# Patient Record
Sex: FEMALE | Race: OTHER | NOT HISPANIC OR LATINO
[De-identification: names, ages, dates, MRNs, and addresses within clinical notes are randomized per-mention and may not be internally consistent; named-entity substitution may affect disease eponyms.]

---

## 2021-07-16 PROBLEM — Z00.00 ENCOUNTER FOR PREVENTIVE HEALTH EXAMINATION: Status: ACTIVE | Noted: 2021-07-16

## 2021-07-20 ENCOUNTER — APPOINTMENT (OUTPATIENT)
Dept: OBGYN | Facility: HOSPITAL | Age: 51
End: 2021-07-20

## 2022-01-27 ENCOUNTER — APPOINTMENT (OUTPATIENT)
Dept: OBGYN | Facility: CLINIC | Age: 52
End: 2022-01-27
Payer: MEDICARE

## 2022-01-27 VITALS
DIASTOLIC BLOOD PRESSURE: 88 MMHG | SYSTOLIC BLOOD PRESSURE: 136 MMHG | WEIGHT: 124 LBS | HEIGHT: 60 IN | HEART RATE: 112 BPM | BODY MASS INDEX: 24.35 KG/M2

## 2022-01-27 DIAGNOSIS — Z01.411 ENCOUNTER FOR GYNECOLOGICAL EXAMINATION (GENERAL) (ROUTINE) WITH ABNORMAL FINDINGS: ICD-10-CM

## 2022-01-27 PROCEDURE — 99386 PREV VISIT NEW AGE 40-64: CPT

## 2022-01-27 NOTE — HISTORY OF PRESENT ILLNESS
[FreeTextEntry1] : 50 y/o female  LMP 22, regular but heavy\par last mammo\par has not seen GYN in 20 years, does not remember when had last pap smear\par has pelvic pain that radiates to her legs, take OTC pain meds for pelvic pain. pain started to get worse on monday [Heavy Bleeding] : heavy bleeding

## 2022-01-27 NOTE — PLAN
[FreeTextEntry1] : NOEMI is a 51 year year old  presenting for well woman exam. Sexually active, monogamous with 1 male partner. \par \par has pelvic pain and heavy menses. will get pelvic sono\par \par HCM\par pap/hpv\par mammo\par gastro referral for screening colonoscopy

## 2022-01-28 LAB — HPV HIGH+LOW RISK DNA PNL CVX: NOT DETECTED

## 2022-02-02 LAB — CYTOLOGY CVX/VAG DOC THIN PREP: NORMAL

## 2022-02-03 ENCOUNTER — RESULT REVIEW (OUTPATIENT)
Age: 52
End: 2022-02-03

## 2022-02-03 ENCOUNTER — APPOINTMENT (OUTPATIENT)
Dept: MAMMOGRAPHY | Facility: CLINIC | Age: 52
End: 2022-02-03
Payer: MEDICARE

## 2022-02-03 PROCEDURE — 77067 SCR MAMMO BI INCL CAD: CPT

## 2022-02-03 PROCEDURE — 77063 BREAST TOMOSYNTHESIS BI: CPT

## 2022-02-04 DIAGNOSIS — R92.8 OTHER ABNORMAL AND INCONCLUSIVE FINDINGS ON DIAGNOSTIC IMAGING OF BREAST: ICD-10-CM

## 2022-02-09 ENCOUNTER — APPOINTMENT (OUTPATIENT)
Dept: OBGYN | Facility: CLINIC | Age: 52
End: 2022-02-09
Payer: MEDICAID

## 2022-02-09 ENCOUNTER — ASOB RESULT (OUTPATIENT)
Age: 52
End: 2022-02-09

## 2022-02-09 VITALS
HEIGHT: 60 IN | SYSTOLIC BLOOD PRESSURE: 127 MMHG | WEIGHT: 122 LBS | DIASTOLIC BLOOD PRESSURE: 83 MMHG | BODY MASS INDEX: 23.95 KG/M2

## 2022-02-09 DIAGNOSIS — D21.9 BENIGN NEOPLASM OF CONNECTIVE AND OTHER SOFT TISSUE, UNSPECIFIED: ICD-10-CM

## 2022-02-09 DIAGNOSIS — N80.0 ENDOMETRIOSIS OF UTERUS: ICD-10-CM

## 2022-02-09 DIAGNOSIS — N92.0 EXCESSIVE AND FREQUENT MENSTRUATION WITH REGULAR CYCLE: ICD-10-CM

## 2022-02-09 PROCEDURE — 76830 TRANSVAGINAL US NON-OB: CPT

## 2022-02-09 PROCEDURE — 99213 OFFICE O/P EST LOW 20 MIN: CPT

## 2022-02-09 PROCEDURE — 76857 US EXAM PELVIC LIMITED: CPT

## 2022-02-11 ENCOUNTER — RESULT REVIEW (OUTPATIENT)
Age: 52
End: 2022-02-11

## 2022-02-11 ENCOUNTER — APPOINTMENT (OUTPATIENT)
Dept: MAMMOGRAPHY | Facility: IMAGING CENTER | Age: 52
End: 2022-02-11

## 2022-02-11 ENCOUNTER — APPOINTMENT (OUTPATIENT)
Dept: ULTRASOUND IMAGING | Facility: IMAGING CENTER | Age: 52
End: 2022-02-11

## 2022-02-11 ENCOUNTER — OUTPATIENT (OUTPATIENT)
Dept: OUTPATIENT SERVICES | Facility: HOSPITAL | Age: 52
LOS: 1 days | End: 2022-02-11
Payer: MEDICAID

## 2022-02-11 DIAGNOSIS — Z00.8 ENCOUNTER FOR OTHER GENERAL EXAMINATION: ICD-10-CM

## 2022-02-11 PROCEDURE — 77066 DX MAMMO INCL CAD BI: CPT | Mod: 26

## 2022-02-11 PROCEDURE — 77066 DX MAMMO INCL CAD BI: CPT

## 2022-02-11 PROCEDURE — 77062 BREAST TOMOSYNTHESIS BI: CPT | Mod: 26

## 2022-02-11 PROCEDURE — G0279: CPT

## 2022-02-11 PROCEDURE — 76642 ULTRASOUND BREAST LIMITED: CPT | Mod: 26,50

## 2022-02-11 PROCEDURE — 76642 ULTRASOUND BREAST LIMITED: CPT

## 2022-02-15 ENCOUNTER — APPOINTMENT (OUTPATIENT)
Dept: GASTROENTEROLOGY | Facility: CLINIC | Age: 52
End: 2022-02-15
Payer: COMMERCIAL

## 2022-02-15 ENCOUNTER — NON-APPOINTMENT (OUTPATIENT)
Age: 52
End: 2022-02-15

## 2022-02-15 VITALS
SYSTOLIC BLOOD PRESSURE: 139 MMHG | HEART RATE: 102 BPM | OXYGEN SATURATION: 98 % | HEIGHT: 59 IN | TEMPERATURE: 98.1 F | BODY MASS INDEX: 23.99 KG/M2 | DIASTOLIC BLOOD PRESSURE: 82 MMHG | WEIGHT: 119 LBS

## 2022-02-15 DIAGNOSIS — R14.1 GAS PAIN: ICD-10-CM

## 2022-02-15 DIAGNOSIS — Z12.11 ENCOUNTER FOR SCREENING FOR MALIGNANT NEOPLASM OF COLON: ICD-10-CM

## 2022-02-15 DIAGNOSIS — G89.29 PELVIC AND PERINEAL PAIN: ICD-10-CM

## 2022-02-15 DIAGNOSIS — Z01.812 ENCOUNTER FOR PREPROCEDURAL LABORATORY EXAMINATION: ICD-10-CM

## 2022-02-15 DIAGNOSIS — Z83.3 FAMILY HISTORY OF DIABETES MELLITUS: ICD-10-CM

## 2022-02-15 DIAGNOSIS — R10.2 PELVIC AND PERINEAL PAIN: ICD-10-CM

## 2022-02-15 DIAGNOSIS — Z20.822 ENCOUNTER FOR PREPROCEDURAL LABORATORY EXAMINATION: ICD-10-CM

## 2022-02-15 DIAGNOSIS — Z82.49 FAMILY HISTORY OF ISCHEMIC HEART DISEASE AND OTHER DISEASES OF THE CIRCULATORY SYSTEM: ICD-10-CM

## 2022-02-15 DIAGNOSIS — Z12.12 ENCOUNTER FOR SCREENING FOR MALIGNANT NEOPLASM OF COLON: ICD-10-CM

## 2022-02-15 PROCEDURE — 99204 OFFICE O/P NEW MOD 45 MIN: CPT

## 2022-02-15 NOTE — ASSESSMENT
[FreeTextEntry1] : Screening colonoscopy.  The importance of colon cancer screening and the colonoscopy  prep was discussed with the patient.  She understands and all questions were answered.

## 2022-02-15 NOTE — HISTORY OF PRESENT ILLNESS
[Heartburn] : stable heartburn [Nausea] : denies nausea [Vomiting] : denies vomiting [Diarrhea] : denies diarrhea [Yellow Skin Or Eyes (Jaundice)] : denies jaundice [Abdominal Swelling] : denies abdominal swelling [Rectal Pain] : denies rectal pain [Constipation] : constipation [Abdominal Pain] : abdominal pain [Wt Gain ___ Lbs] : no recent weight gain [Wt Loss ___ Lbs] : no recent weight loss [GERD] : no gastroesophageal reflux disease [Hiatus Hernia] : no hiatus hernia [Peptic Ulcer Disease] : no peptic ulcer disease [Pancreatitis] : no pancreatitis [Cholelithiasis] : no cholelithiasis [Kidney Stone] : no kidney stone [Inflammatory Bowel Disease] : no inflammatory bowel disease [Irritable Bowel Syndrome] : no irritable bowel syndrome [Diverticulitis] : no diverticulitis [Alcohol Abuse] : no alcohol abuse [Malignancy] : no malignancy [Abdominal Surgery] : no abdominal surgery [Appendectomy] : no appendectomy [Cholecystectomy] : no cholecystectomy [de-identified] : 51-year-old woman referred for a screening colonoscopy.  She is being evaluated by her gynecologist for pelvic pain and fibroids.  She also complains of low abdominal gas.  She denies rectal bleeding, melena or hematemesis.  This is her first colonoscopy.  She is otherwise in good health.

## 2022-02-15 NOTE — PHYSICAL EXAM
[General Appearance - Alert] : alert [General Appearance - In No Acute Distress] : in no acute distress [Sclera] : the sclera and conjunctiva were normal [PERRL With Normal Accommodation] : pupils were equal in size, round, and reactive to light [Extraocular Movements] : extraocular movements were intact [Outer Ear] : the ears and nose were normal in appearance [Neck Appearance] : the appearance of the neck was normal [Neck Cervical Mass (___cm)] : no neck mass was observed [Jugular Venous Distention Increased] : there was no jugular-venous distention [Thyroid Diffuse Enlargement] : the thyroid was not enlarged [Thyroid Nodule] : there were no palpable thyroid nodules [Auscultation Breath Sounds / Voice Sounds] : lungs were clear to auscultation bilaterally [Heart Rate And Rhythm] : heart rate was normal and rhythm regular [Heart Sounds] : normal S1 and S2 [Heart Sounds Gallop] : no gallops [Murmurs] : no murmurs [Heart Sounds Pericardial Friction Rub] : no pericardial rub [Bowel Sounds] : normal bowel sounds [Abdomen Soft] : soft [Abdomen Tenderness] : non-tender [Abdomen Mass (___ Cm)] : no abdominal mass palpated [] : no hepato-splenomegaly [Cervical Lymph Nodes Enlarged Posterior Bilaterally] : posterior cervical [Cervical Lymph Nodes Enlarged Anterior Bilaterally] : anterior cervical [Supraclavicular Lymph Nodes Enlarged Bilaterally] : supraclavicular [No CVA Tenderness] : no ~M costovertebral angle tenderness [No Spinal Tenderness] : no spinal tenderness [Abnormal Walk] : normal gait [Nail Clubbing] : no clubbing  or cyanosis of the fingernails [Musculoskeletal - Swelling] : no joint swelling seen [Motor Tone] : muscle strength and tone were normal [Deep Tendon Reflexes (DTR)] : deep tendon reflexes were 2+ and symmetric [Sensation] : the sensory exam was normal to light touch and pinprick [No Focal Deficits] : no focal deficits [Oriented To Time, Place, And Person] : oriented to person, place, and time [Impaired Insight] : insight and judgment were intact [Affect] : the affect was normal

## 2022-02-18 ENCOUNTER — NON-APPOINTMENT (OUTPATIENT)
Age: 52
End: 2022-02-18

## 2022-03-11 ENCOUNTER — RESULT REVIEW (OUTPATIENT)
Age: 52
End: 2022-03-11

## 2022-03-11 ENCOUNTER — APPOINTMENT (OUTPATIENT)
Dept: ULTRASOUND IMAGING | Facility: CLINIC | Age: 52
End: 2022-03-11
Payer: MEDICAID

## 2022-03-11 ENCOUNTER — OUTPATIENT (OUTPATIENT)
Dept: OUTPATIENT SERVICES | Facility: HOSPITAL | Age: 52
LOS: 1 days | End: 2022-03-11
Payer: MEDICAID

## 2022-03-11 DIAGNOSIS — Z00.8 ENCOUNTER FOR OTHER GENERAL EXAMINATION: ICD-10-CM

## 2022-03-11 DIAGNOSIS — R92.8 OTHER ABNORMAL AND INCONCLUSIVE FINDINGS ON DIAGNOSTIC IMAGING OF BREAST: ICD-10-CM

## 2022-03-11 PROCEDURE — 19083 BX BREAST 1ST LESION US IMAG: CPT

## 2022-03-11 PROCEDURE — 88377 M/PHMTRC ALYS ISHQUANT/SEMIQ: CPT | Mod: 26

## 2022-03-11 PROCEDURE — 77065 DX MAMMO INCL CAD UNI: CPT | Mod: 26,RT

## 2022-03-11 PROCEDURE — 88360 TUMOR IMMUNOHISTOCHEM/MANUAL: CPT | Mod: 26

## 2022-03-11 PROCEDURE — 88305 TISSUE EXAM BY PATHOLOGIST: CPT | Mod: 26

## 2022-03-11 PROCEDURE — 88377 M/PHMTRC ALYS ISHQUANT/SEMIQ: CPT

## 2022-03-11 PROCEDURE — 77065 DX MAMMO INCL CAD UNI: CPT

## 2022-03-11 PROCEDURE — 88305 TISSUE EXAM BY PATHOLOGIST: CPT

## 2022-03-11 PROCEDURE — A4648: CPT

## 2022-03-11 PROCEDURE — 88360 TUMOR IMMUNOHISTOCHEM/MANUAL: CPT

## 2022-03-11 PROCEDURE — 19083 BX BREAST 1ST LESION US IMAG: CPT | Mod: RT

## 2022-03-15 ENCOUNTER — NON-APPOINTMENT (OUTPATIENT)
Age: 52
End: 2022-03-15

## 2022-03-15 ENCOUNTER — TRANSCRIPTION ENCOUNTER (OUTPATIENT)
Age: 52
End: 2022-03-15

## 2022-03-15 DIAGNOSIS — D05.90 UNSPECIFIED TYPE OF CARCINOMA IN SITU OF UNSPECIFIED BREAST: ICD-10-CM

## 2022-03-25 ENCOUNTER — OUTPATIENT (OUTPATIENT)
Dept: OUTPATIENT SERVICES | Facility: HOSPITAL | Age: 52
LOS: 1 days | End: 2022-03-25

## 2022-03-25 ENCOUNTER — APPOINTMENT (OUTPATIENT)
Dept: MRI IMAGING | Facility: CLINIC | Age: 52
End: 2022-03-25
Payer: MEDICAID

## 2022-03-25 PROCEDURE — 77049 MRI BREAST C-+ W/CAD BI: CPT | Mod: 26

## 2022-03-30 ENCOUNTER — NON-APPOINTMENT (OUTPATIENT)
Age: 52
End: 2022-03-30

## 2022-03-30 ENCOUNTER — APPOINTMENT (OUTPATIENT)
Dept: SURGICAL ONCOLOGY | Facility: CLINIC | Age: 52
End: 2022-03-30
Payer: MEDICAID

## 2022-03-30 VITALS
DIASTOLIC BLOOD PRESSURE: 93 MMHG | OXYGEN SATURATION: 97 % | HEIGHT: 59 IN | RESPIRATION RATE: 16 BRPM | HEART RATE: 106 BPM | WEIGHT: 120 LBS | BODY MASS INDEX: 24.19 KG/M2 | SYSTOLIC BLOOD PRESSURE: 158 MMHG

## 2022-03-30 DIAGNOSIS — Z78.9 OTHER SPECIFIED HEALTH STATUS: ICD-10-CM

## 2022-03-30 DIAGNOSIS — Z80.1 FAMILY HISTORY OF MALIGNANT NEOPLASM OF TRACHEA, BRONCHUS AND LUNG: ICD-10-CM

## 2022-03-30 PROCEDURE — 99205 OFFICE O/P NEW HI 60 MIN: CPT

## 2022-03-30 RX ORDER — NORETHINDRONE ACETATE AND ETHINYL ESTRADIOL 1; 20 MG/1; UG/1
1-20 TABLET ORAL DAILY
Qty: 3 | Refills: 2 | Status: DISCONTINUED | COMMUNITY
Start: 2022-02-09 | End: 2022-03-30

## 2022-03-30 RX ORDER — POLYETHYLENE GLYCOL 3350, SODIUM CHLORIDE, SODIUM BICARBONATE AND POTASSIUM CHLORIDE WITH LEMON FLAVOR 420; 11.2; 5.72; 1.48 G/4L; G/4L; G/4L; G/4L
420 POWDER, FOR SOLUTION ORAL
Qty: 1 | Refills: 0 | Status: DISCONTINUED | COMMUNITY
Start: 2022-02-15 | End: 2022-03-30

## 2022-03-30 NOTE — DATA REVIEWED
[FreeTextEntry1] : 2/3/2022 B/L SM \par 2/11/2022 B/L DM and US\par 3/11/2022 R USG-CNB\par 3/25/2022 MRI

## 2022-03-30 NOTE — PHYSICAL EXAM
[Normocephalic] : normocephalic [Atraumatic] : atraumatic [EOMI] : extra ocular movement intact [PERRL] : pupils equal, round and reactive to light [Sclera nonicteric] : sclera nonicteric [Supple] : supple [No Supraclavicular Adenopathy] : no supraclavicular adenopathy [No Cervical Adenopathy] : no cervical adenopathy [No Thyromegaly] : no thyromegaly [Examined in the supine and seated position] : examined in the supine and seated position [Bra Size: ___] : Bra Size: [unfilled] [Grade 1] : Ptosis Grade 1 [No dominant masses] : no dominant masses in right breast  [No dominant masses] : no dominant masses left breast [No Nipple Retraction] : no left nipple retraction [No Nipple Discharge] : no left nipple discharge [Breast Mass Right Breast ___cm] : no masses [Breast Mass Left Breast ___cm] : no masses [Breast Nipple Inversion] : nipples not inverted [Breast Nipple Retraction] : nipples not retracted [Breast Nipple Flattening] : nipples not flattened [Breast Nipple Fissures] : nipples not fissured [Breast Abnormal Lactation (Galactorrhea)] : no galactorrhea [Breast Abnormal Secretion Bloody Fluid] : no bloody discharge [Breast Abnormal Secretion Serous Fluid] : no serous discharge [Breast Abnormal Secretion Opalescent Fluid] : no milky discharge [No Axillary Lymphadenopathy] : no left axillary lymphadenopathy [No Edema] : no edema [No Rashes] : no rashes [No Ulceration] : no ulceration [de-identified] : non-labored respirations

## 2022-03-30 NOTE — PAST MEDICAL HISTORY
[Menstruating] : The patient is menstruating [Menarche Age ____] : age at menarche was [unfilled] [Definite ___ (Date)] : the last menstrual period was [unfilled] [Regular Cycle Intervals] : have been regular [Total Preg ___] : G[unfilled] [Live Births ___] : P[unfilled]  [Living ___] : Living: [unfilled] [FreeTextEntry2] : 1 baby  at 5 months [FreeTextEntry6] : none [FreeTextEntry7] : recently placed on OCPs for fibroids, but stopped due to "pressures" [FreeTextEntry8] : 3 years

## 2022-03-30 NOTE — HISTORY OF PRESENT ILLNESS
[FreeTextEntry1] : The patient is a 51 year old female referred for consultation by Dr. Estiven August for Right breast IDC\par \par She is accompanied by her daugher Viviana and her grandson Scot.\par \par The patient reports that she does not get regular screening. Her first mammogram was 3 years ago, reportedly normal. Her most recent imaging showed:\par \par 2/03/2022 B/L SM (NW) TC 5.7%, scattered fibroglandular densities \par  - R breast 6:00 focal distortion -> DM and US\par  - R UOQ focal mass -> US  \par  - L far posterior central to slightly outer breast -> DM and US\par  - BR0\par \par 2/11/2022 B/L DM\par  - R central lower breast 6:00 persistent architectural distortion\par  - L slightly outer asymmetry predominantly effaces\par  - L breast asymmetry not seen on ML view\par \par 2/11/2022 B/L US\par  - R 6:00 N5 1.4 x 0.7 x 0.7 cm irregular hypoechoic mass -> BX\par  - R 9:00 N10 6 mm benign-appearing IMLN\par  - R 9:00 N9 8 mm benign-appearing complicated cyst; adjacent 4 mm complicated cyst\par  - L negative\par  - BR4C\par \par 3/11/2022 R USG-CNB\par  - R 6:00 N5 (coil): IDC, G1, DCIS\par  - ER >75% mod, MO >90% strong, HER2 2+/FISH negative (ratio 1.0, copy 1.8)\par \par 3/25/2022 MRI\par  - R spiculated mass posterior and slightly lateral to biopsied carcinoma\par  - L lower outer quadrant 12 mm small spiculated mass -> BX\par  - B/L LN negative\par  - BR6\par \par The patient reports that she has not had the left breast biopsied. She does not feel the mass. Otherwise has no breast complaints.\par \par She denies any medical or surgical history. She takes no medications\par Denies any family history of breast cancer. Has a maternal aunt with lung cancer.

## 2022-03-30 NOTE — ASSESSMENT
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 ER/SC positive, HER2 negative.\par \par We discussed her situation at length in the office today with the patient. First we discussed her histopathology and biomarkers in terms of prognosis and adjuvant therapy. \par \par We discussed surgical options including mastectomy versus lumpectomy. She understands that she would not get a better outcome or longer survival with the mastectomy, although risk of local recurrence is lower. After breast conserving surgery, she may see asymmetry in size. She understands that radiation therapy and postoperative evaluation by Medical Oncology are integral parts of breast-conserving treatment and these will be addressed postoperatively. If we proceed with mastectomy, there is a still a chance that radiation will be recommended but less likely.\par  \par If we proceed with lumpectomy, she understands that it will be important to obtain clear margins and there is a 5-10% risk of having to go back for additional tissue. With mastectomy, there is still a small amount of breast tissue (probably on the order of 5%) that remains which will continue to require yearly clinical examination.\par  \par The risks of surgery include bleeding, infection, scarring, numbness, possible discrepancy in breast size with lumpectomy/reconstructed breast.  At the time of lumpectomy, a pre-operative localization of the lesion is required.\par  \par An MRI is recommended for further evaluation of disease extent and the possible presence of multicentric or contralateral disease.  This was already done and showed an additional area of suspicion in the left breast--they will undergo a biopsy.\par \par We discussed the role of sentinel node biopsy. This will confirm the stage and extent of disease. The risk of lymphedema is 5% with a sentinel lymph node biopsy and 15% with an axillary lymph node dissection.\par  \par Preoperative, intraoperative, and postoperative considerations were reviewed including anesthetic management and what she can expect when she is recovering from surgery. Risks, benefits, alternatives, and various management options were discussed with the patient.\par \par Following our discussion, we have decided to proceed with first obtaining the left breast MR biopsy. If negative, will proceed with lumpectomy and SLNB\par   \par Ms Pavon verbalizes her understanding of the procedure and the risks/benefits/complications and alternatives were discussed questions were answered. She knows to call me if she has any additional questions or concerns.\par  \par Plan:\par  - MR biopsy Left breast\par  - GT\par  - Tentatively would prefer a Right lumpectomy with Magseed (1 site), SLNB\par

## 2022-04-08 ENCOUNTER — RESULT REVIEW (OUTPATIENT)
Age: 52
End: 2022-04-08

## 2022-04-08 ENCOUNTER — APPOINTMENT (OUTPATIENT)
Dept: MRI IMAGING | Facility: CLINIC | Age: 52
End: 2022-04-08
Payer: MEDICAID

## 2022-04-08 ENCOUNTER — OUTPATIENT (OUTPATIENT)
Dept: OUTPATIENT SERVICES | Facility: HOSPITAL | Age: 52
LOS: 1 days | End: 2022-04-08

## 2022-04-08 PROCEDURE — 19085 BX BREAST 1ST LESION MR IMAG: CPT | Mod: LT

## 2022-04-08 PROCEDURE — 77065 DX MAMMO INCL CAD UNI: CPT | Mod: 26,LT

## 2022-04-08 PROCEDURE — 88305 TISSUE EXAM BY PATHOLOGIST: CPT | Mod: 26

## 2022-04-11 ENCOUNTER — APPOINTMENT (OUTPATIENT)
Dept: INTERNAL MEDICINE | Facility: CLINIC | Age: 52
End: 2022-04-11

## 2022-04-12 LAB — SURGICAL PATHOLOGY STUDY: SIGNIFICANT CHANGE UP

## 2022-04-28 ENCOUNTER — RESULT REVIEW (OUTPATIENT)
Age: 52
End: 2022-04-28

## 2022-04-28 ENCOUNTER — APPOINTMENT (OUTPATIENT)
Dept: ULTRASOUND IMAGING | Facility: IMAGING CENTER | Age: 52
End: 2022-04-28
Payer: MEDICAID

## 2022-04-28 ENCOUNTER — OUTPATIENT (OUTPATIENT)
Dept: OUTPATIENT SERVICES | Facility: HOSPITAL | Age: 52
LOS: 1 days | End: 2022-04-28
Payer: MEDICAID

## 2022-04-28 ENCOUNTER — OUTPATIENT (OUTPATIENT)
Dept: OUTPATIENT SERVICES | Facility: HOSPITAL | Age: 52
LOS: 1 days | End: 2022-04-28

## 2022-04-28 VITALS
TEMPERATURE: 98 F | RESPIRATION RATE: 16 BRPM | SYSTOLIC BLOOD PRESSURE: 130 MMHG | HEIGHT: 57 IN | HEART RATE: 71 BPM | WEIGHT: 126.1 LBS | OXYGEN SATURATION: 98 % | DIASTOLIC BLOOD PRESSURE: 80 MMHG

## 2022-04-28 DIAGNOSIS — Z98.890 OTHER SPECIFIED POSTPROCEDURAL STATES: Chronic | ICD-10-CM

## 2022-04-28 DIAGNOSIS — C50.911 MALIGNANT NEOPLASM OF UNSPECIFIED SITE OF RIGHT FEMALE BREAST: ICD-10-CM

## 2022-04-28 DIAGNOSIS — Z00.8 ENCOUNTER FOR OTHER GENERAL EXAMINATION: ICD-10-CM

## 2022-04-28 LAB
ALBUMIN SERPL ELPH-MCNC: 4.6 G/DL — SIGNIFICANT CHANGE UP (ref 3.3–5)
ALP SERPL-CCNC: 40 U/L — SIGNIFICANT CHANGE UP (ref 40–120)
ALT FLD-CCNC: 17 U/L — SIGNIFICANT CHANGE UP (ref 4–33)
ANION GAP SERPL CALC-SCNC: 14 MMOL/L — SIGNIFICANT CHANGE UP (ref 7–14)
AST SERPL-CCNC: 21 U/L — SIGNIFICANT CHANGE UP (ref 4–32)
BILIRUB SERPL-MCNC: 0.3 MG/DL — SIGNIFICANT CHANGE UP (ref 0.2–1.2)
BUN SERPL-MCNC: 15 MG/DL — SIGNIFICANT CHANGE UP (ref 7–23)
CALCIUM SERPL-MCNC: 9.9 MG/DL — SIGNIFICANT CHANGE UP (ref 8.4–10.5)
CHLORIDE SERPL-SCNC: 105 MMOL/L — SIGNIFICANT CHANGE UP (ref 98–107)
CO2 SERPL-SCNC: 25 MMOL/L — SIGNIFICANT CHANGE UP (ref 22–31)
CREAT SERPL-MCNC: 0.75 MG/DL — SIGNIFICANT CHANGE UP (ref 0.5–1.3)
EGFR: 96 ML/MIN/1.73M2 — SIGNIFICANT CHANGE UP
GLUCOSE SERPL-MCNC: 97 MG/DL — SIGNIFICANT CHANGE UP (ref 70–99)
HCG SERPL-ACNC: <5 MIU/ML — SIGNIFICANT CHANGE UP
HCT VFR BLD CALC: 39.7 % — SIGNIFICANT CHANGE UP (ref 34.5–45)
HGB BLD-MCNC: 12.4 G/DL — SIGNIFICANT CHANGE UP (ref 11.5–15.5)
MCHC RBC-ENTMCNC: 25.6 PG — LOW (ref 27–34)
MCHC RBC-ENTMCNC: 31.2 GM/DL — LOW (ref 32–36)
MCV RBC AUTO: 82 FL — SIGNIFICANT CHANGE UP (ref 80–100)
NRBC # BLD: 0 /100 WBCS — SIGNIFICANT CHANGE UP
NRBC # FLD: 0 K/UL — SIGNIFICANT CHANGE UP
PLATELET # BLD AUTO: 408 K/UL — HIGH (ref 150–400)
POTASSIUM SERPL-MCNC: 4.1 MMOL/L — SIGNIFICANT CHANGE UP (ref 3.5–5.3)
POTASSIUM SERPL-SCNC: 4.1 MMOL/L — SIGNIFICANT CHANGE UP (ref 3.5–5.3)
PROT SERPL-MCNC: 8.6 G/DL — HIGH (ref 6–8.3)
RBC # BLD: 4.84 M/UL — SIGNIFICANT CHANGE UP (ref 3.8–5.2)
RBC # FLD: 14.7 % — HIGH (ref 10.3–14.5)
SODIUM SERPL-SCNC: 144 MMOL/L — SIGNIFICANT CHANGE UP (ref 135–145)
WBC # BLD: 8.03 K/UL — SIGNIFICANT CHANGE UP (ref 3.8–10.5)
WBC # FLD AUTO: 8.03 K/UL — SIGNIFICANT CHANGE UP (ref 3.8–10.5)

## 2022-04-28 PROCEDURE — C1739: CPT

## 2022-04-28 PROCEDURE — 19285 PERQ DEV BREAST 1ST US IMAG: CPT

## 2022-04-28 PROCEDURE — 19285 PERQ DEV BREAST 1ST US IMAG: CPT | Mod: RT

## 2022-04-28 NOTE — H&P PST ADULT - BREAST SYMPTOMS
seen on imagining (mammogram and MRI)/breast tenderness L/breast lump L/breast lump R seen on imagining (mammogram and MRI of left breast)/breast tenderness L/breast lump L/breast lump R

## 2022-04-28 NOTE — H&P PST ADULT - PROBLEM SELECTOR PLAN 1
preop fo right lumpectomy with magseed 1 site, right sentinel lymph node biopsy on 5/2/22  preop instructions given, pt verbalized understanding  GI prophylaxis and chlorhexidine wash provided  pt informed COVID testing must be done 72 hours prior to surgery preop fo right lumpectomy with magseed 1 site, right sentinel lymph node biopsy on 5/2/22  preop instructions given, pt and son verbalized understanding  GI prophylaxis and chlorhexidine wash provided  pt informed COVID testing must be done 72 hours prior to surgery preop for right lumpectomy with magseed 1 site, right sentinel lymph node biopsy on 5/2/22  preop instructions given, pt and son verbalized understanding  GI prophylaxis and chlorhexidine wash provided  pt informed COVID testing must be done 72 hours prior to surgery

## 2022-04-28 NOTE — H&P PST ADULT - HISTORY OF PRESENT ILLNESS
50 y/o female presents to PST preop fo right lumpectomy with magseed 1 site, right sentinel lymph node biopsy. pt states right breast mass was seen during a routine mammogram. s/p biopsy, pathology confirmed malignancy. 50 y/o female presents to PST preop for right lumpectomy with magseed 1 site, right sentinel lymph node biopsy. pt states right breast mass was seen during a routine mammogram. s/p biopsy, pathology confirmed malignancy.

## 2022-04-28 NOTE — H&P PST ADULT - NEGATIVE BREAST SYMPTOMS
no breast tenderness R/no nipple discharge L/no nipple discharge R no breast tenderness L/no breast tenderness R/no nipple discharge L/no nipple discharge R

## 2022-04-29 VITALS
HEART RATE: 117 BPM | SYSTOLIC BLOOD PRESSURE: 149 MMHG | TEMPERATURE: 98 F | DIASTOLIC BLOOD PRESSURE: 87 MMHG | OXYGEN SATURATION: 100 % | WEIGHT: 126.1 LBS | RESPIRATION RATE: 20 BRPM | HEIGHT: 57 IN

## 2022-04-29 PROBLEM — C50.911 MALIGNANT NEOPLASM OF UNSPECIFIED SITE OF RIGHT FEMALE BREAST: Chronic | Status: ACTIVE | Noted: 2022-04-28

## 2022-04-29 PROBLEM — D25.9 LEIOMYOMA OF UTERUS, UNSPECIFIED: Chronic | Status: ACTIVE | Noted: 2022-04-28

## 2022-04-29 NOTE — ASU PREOPERATIVE ASSESSMENT, ADULT (IPARK ONLY) - PRO INTERPRETER NEED 2
AUDIE 12/3/2017 28w3d  Gestational Diabetes  Current DM medication recommeded: 2 units humalog at breakfast and dinner      The pt emailed:Hi, this is my blood sugar check in this week.  Please forward to Bonnie again.    Self Glucose Testing      Date   Brkfst   After   Lunch   After   Supper   After   9/6 92 90  85  137   7 82 103  99  93   8 94 96  155  112   9 88 92  119  92   10 85 100  98  107   11 89 83  112  130   12 91 109  97  112   13  98  90   76    146  14  85     English

## 2022-05-01 ENCOUNTER — RESULT REVIEW (OUTPATIENT)
Age: 52
End: 2022-05-01

## 2022-05-02 ENCOUNTER — TRANSCRIPTION ENCOUNTER (OUTPATIENT)
Age: 52
End: 2022-05-02

## 2022-05-02 ENCOUNTER — OUTPATIENT (OUTPATIENT)
Dept: OUTPATIENT SERVICES | Facility: HOSPITAL | Age: 52
LOS: 1 days | End: 2022-05-02
Payer: COMMERCIAL

## 2022-05-02 ENCOUNTER — OUTPATIENT (OUTPATIENT)
Dept: OUTPATIENT SERVICES | Facility: HOSPITAL | Age: 52
LOS: 1 days | Discharge: HOME | End: 2022-05-02
Payer: MEDICAID

## 2022-05-02 ENCOUNTER — APPOINTMENT (OUTPATIENT)
Dept: MAMMOGRAPHY | Facility: IMAGING CENTER | Age: 52
End: 2022-05-02
Payer: MEDICAID

## 2022-05-02 ENCOUNTER — RESULT REVIEW (OUTPATIENT)
Age: 52
End: 2022-05-02

## 2022-05-02 ENCOUNTER — APPOINTMENT (OUTPATIENT)
Dept: SURGICAL ONCOLOGY | Facility: AMBULATORY SURGERY CENTER | Age: 52
End: 2022-05-02

## 2022-05-02 ENCOUNTER — APPOINTMENT (OUTPATIENT)
Dept: NUCLEAR MEDICINE | Facility: IMAGING CENTER | Age: 52
End: 2022-05-02
Payer: MEDICAID

## 2022-05-02 VITALS
HEART RATE: 95 BPM | RESPIRATION RATE: 20 BRPM | OXYGEN SATURATION: 98 % | DIASTOLIC BLOOD PRESSURE: 72 MMHG | SYSTOLIC BLOOD PRESSURE: 113 MMHG

## 2022-05-02 DIAGNOSIS — Z98.890 OTHER SPECIFIED POSTPROCEDURAL STATES: Chronic | ICD-10-CM

## 2022-05-02 DIAGNOSIS — C50.911 MALIGNANT NEOPLASM OF UNSPECIFIED SITE OF RIGHT FEMALE BREAST: ICD-10-CM

## 2022-05-02 DIAGNOSIS — Z00.8 ENCOUNTER FOR OTHER GENERAL EXAMINATION: ICD-10-CM

## 2022-05-02 PROCEDURE — 76098 X-RAY EXAM SURGICAL SPECIMEN: CPT | Mod: 26

## 2022-05-02 PROCEDURE — 38900 IO MAP OF SENT LYMPH NODE: CPT | Mod: RT

## 2022-05-02 PROCEDURE — 76098 X-RAY EXAM SURGICAL SPECIMEN: CPT

## 2022-05-02 PROCEDURE — 88305 TISSUE EXAM BY PATHOLOGIST: CPT | Mod: 26

## 2022-05-02 PROCEDURE — 38792 RA TRACER ID OF SENTINL NODE: CPT | Mod: 59,RT

## 2022-05-02 PROCEDURE — A9541: CPT

## 2022-05-02 PROCEDURE — 19301 PARTIAL MASTECTOMY: CPT | Mod: RT

## 2022-05-02 PROCEDURE — 88307 TISSUE EXAM BY PATHOLOGIST: CPT | Mod: 26

## 2022-05-02 PROCEDURE — 38525 BIOPSY/REMOVAL LYMPH NODES: CPT | Mod: RT

## 2022-05-02 RX ORDER — OXYCODONE HYDROCHLORIDE 5 MG/1
1 TABLET ORAL
Qty: 4 | Refills: 0
Start: 2022-05-02 | End: 2022-05-03

## 2022-05-02 NOTE — ASU DISCHARGE PLAN (ADULT/PEDIATRIC) - BATHING
May shower. Leave tegaderm/telfa in place for 48 hours. Afterwards, may remove, but leave steri-stips in place/No change

## 2022-05-02 NOTE — ASU DISCHARGE PLAN (ADULT/PEDIATRIC) - CARE PROVIDER_API CALL
Traci Rodríguez)  Surgery  [address] [place] [id number]  Phone: [phone]  Fax: [phone]  Established Patient  Follow Up Time: 1 week

## 2022-05-02 NOTE — ASU DISCHARGE PLAN (ADULT/PEDIATRIC) - ASU DC SPECIAL INSTRUCTIONSFT
May shower. Leave tegaderm/telfa in place for 48 hours. Afterwards, may remove, but leave steri-stips in place.    Follow-up in one week.    Wear home bra

## 2022-05-04 ENCOUNTER — APPOINTMENT (OUTPATIENT)
Dept: OBGYN | Facility: CLINIC | Age: 52
End: 2022-05-04

## 2022-05-06 ENCOUNTER — APPOINTMENT (OUTPATIENT)
Dept: GASTROENTEROLOGY | Facility: HOSPITAL | Age: 52
End: 2022-05-06

## 2022-05-11 ENCOUNTER — APPOINTMENT (OUTPATIENT)
Dept: SURGICAL ONCOLOGY | Facility: CLINIC | Age: 52
End: 2022-05-11
Payer: MEDICAID

## 2022-05-11 VITALS
DIASTOLIC BLOOD PRESSURE: 92 MMHG | SYSTOLIC BLOOD PRESSURE: 153 MMHG | HEART RATE: 117 BPM | BODY MASS INDEX: 24.19 KG/M2 | OXYGEN SATURATION: 97 % | HEIGHT: 59 IN | RESPIRATION RATE: 16 BRPM | WEIGHT: 120 LBS

## 2022-05-11 LAB — SURGICAL PATHOLOGY STUDY: SIGNIFICANT CHANGE UP

## 2022-05-11 PROCEDURE — 99024 POSTOP FOLLOW-UP VISIT: CPT

## 2022-05-11 NOTE — CONSULT LETTER
[Dear  ___] : Dear  [unfilled], [Courtesy Letter:] : I had the pleasure of seeing your patient, [unfilled], in my office today. [Please see my note below.] : Please see my note below. [Consult Closing:] : Thank you very much for allowing me to participate in the care of this patient.  If you have any questions, please do not hesitate to contact me. [Sincerely,] : Sincerely, [FreeTextEntry3] : Traci Rodríguez MD\par Breast Surgeon\par Division of Surgical Oncology\par Department of Surgery\par [address] [place] [address]\[place] [address] \par Tel: [phone]\par Fax: [phone]\par Email: [email or web address]

## 2022-05-11 NOTE — PHYSICAL EXAM
[Normocephalic] : normocephalic [Atraumatic] : atraumatic [EOMI] : extra ocular movement intact [PERRL] : pupils equal, round and reactive to light [Sclera nonicteric] : sclera nonicteric [Bra Size: ___] : Bra Size: [unfilled] [Grade 1] : Ptosis Grade 1 [No dominant masses] : no dominant masses in right breast  [No Nipple Retraction] : no right nipple retraction [No Nipple Discharge] : no right nipple discharge [No Axillary Lymphadenopathy] : no right axillary lymphadenopathy [No Edema] : no edema [No Rashes] : no rashes [No Ulceration] : no ulceration [de-identified] : non-labored respirations  [de-identified] : circumareolar incision healing well. Areolar closure (electrocautery burn, excised, closed primarily), healing well. [de-identified] : Incision healing well, some induration, no fluctuance

## 2022-05-11 NOTE — ASSESSMENT
[FreeTextEntry1] : The patient is a 51 year old female referred for consultation by Dr. Estiven August for Right breast IDC now s/p Right breast lumpectomy and SLNB 5/2/2022.\par \par Final pathology still pending.\par \par Exam shows well-healing incisions. No evidence of infection.\par \par Plan:\par  - Will send oncotype\par  - Medical oncology referral\par  - Rad onc referral\par  - Next breast imaging 2/3/2022 B/L SM and US\par  - RTO 3 months

## 2022-05-11 NOTE — HISTORY OF PRESENT ILLNESS
[FreeTextEntry1] : The patient is a 51 year old female referred for consultation by Dr. Estiven August for Right breast IDC now s/p Right breast lumpectomy and SLNB 5/2/2022.\par \par She is accompanied by her son.\par \par Prior history:\par The patient reports that she does not get regular screening. Her first mammogram was 3 years ago, reportedly normal. Her most recent imaging showed:\par \par 2/03/2022 B/L SM (NW) TC 5.7%, scattered fibroglandular densities \par  - R breast 6:00 focal distortion -> DM and US\par  - R UOQ focal mass -> US  \par  - L far posterior central to slightly outer breast -> DM and US\par  - BR0\par \par 2/11/2022 B/L DM\par  - R central lower breast 6:00 persistent architectural distortion\par  - L slightly outer asymmetry predominantly effaces\par  - L breast asymmetry not seen on ML view\par \par 2/11/2022 B/L US\par  - R 6:00 N5 1.4 x 0.7 x 0.7 cm irregular hypoechoic mass -> BX\par  - R 9:00 N10 6 mm benign-appearing IMLN\par  - R 9:00 N9 8 mm benign-appearing complicated cyst; adjacent 4 mm complicated cyst\par  - L negative\par  - BR4C\par \par 3/11/2022 R USG-CNB\par  - R 6:00 N5 (coil): IDC, G1, DCIS\par  - ER >75% mod, UT >90% strong, HER2 2+/FISH negative (ratio 1.0, copy 1.8)\par \par 3/25/2022 MRI\par  - R spiculated mass posterior and slightly lateral to biopsied carcinoma\par  - L lower outer quadrant 12 mm small spiculated mass -> BX\par  - B/L LN negative\par  - BR6\par \par The patient reports that she has not had the left breast biopsied. She does not feel the mass. Otherwise has no breast complaints.\par \par She denies any medical or surgical history. She takes no medications\par Denies any family history of breast cancer. Has a maternal aunt with lung cancer.\par \par 4/8/2022 L MR bx\par  - L spiculated mass (hourglass clip): UDH, fibroadenomatoid changes, stromal fibrosis, benign concordant\par \par 5/2/2022 R breast lumpectomy, R SLNB\par  - PATH PENDING\par \par Interval history:\par The patient reports taking Tylenol immediately after surgery, but does not require anymore. She has some right shoulder pain, but this is longstanding. Is waiting to schedule shoulder surgery.

## 2022-05-19 ENCOUNTER — APPOINTMENT (OUTPATIENT)
Dept: RADIATION ONCOLOGY | Facility: CLINIC | Age: 52
End: 2022-05-19
Payer: MEDICAID

## 2022-05-19 VITALS
RESPIRATION RATE: 17 BRPM | BODY MASS INDEX: 24.96 KG/M2 | TEMPERATURE: 98.06 F | WEIGHT: 123.79 LBS | OXYGEN SATURATION: 98 % | HEART RATE: 107 BPM | HEIGHT: 59 IN | DIASTOLIC BLOOD PRESSURE: 98 MMHG | SYSTOLIC BLOOD PRESSURE: 160 MMHG

## 2022-05-19 DIAGNOSIS — M25.519 PAIN IN UNSPECIFIED SHOULDER: ICD-10-CM

## 2022-05-19 PROCEDURE — 99204 OFFICE O/P NEW MOD 45 MIN: CPT | Mod: GC,25

## 2022-05-21 NOTE — LETTER CLOSING
[Consult Closing:] : Thank you for allowing me to participate in the care of this patient.  If you have any questions, please do not hesitate to contact me. [Sincerely yours,] : Sincerely yours, [FreeTextEntry3] : Leslie Regalado MD\par

## 2022-05-21 NOTE — HISTORY OF PRESENT ILLNESS
[FreeTextEntry1] : Ms. Pavon is a 51 year old premenopausal female with [id number] ER 75%, IN 90%, HER 2 negative right breast IDC s/p right breast lumpectomy and SLNB with negative margins, now presenting for consideration of adjuvant radiation.\par \par She had her first mammogram 3 years ago, reportedly normal. More recently had a 2/03/22  screening mammogram that showed focal distortion of the right breast at 6:00, a focal mass in the posterior right upper outer quadrant, and a focal asymmetry in the left posterior central to outer breast. \par \par Bilateral diagnostic mammography and ultrasound on 2/11/22 showed persistent architectural distortion in the central lower right breast corresponding to a 1.4 x 0.7 x .0.7 cm irregular hypoechoic mass seen on ultrasound at 6:00, 5 cm from the nipple. The asymmetry in the left breast effaced and was consistent with overlapping glandular tissue. The asymmetry in the right upper outer breast appeared to correspond to benign appearing cysts seen on ultrasound at 9:00. \par \par Ultrasound guided biopsy of the right breast mass at 6:00 on 3/11/22 well-differentiated ductal carcinoma of the right breast, with [place] score 5/9, measuring at least 7 mm. DCIS was also present with cribriform pattern and intermediate nuclear grade. The invasive component was ER 75%, IN > 90%, HER2 equivocal with 2+ membrane staining and negative by CISH. \par \par Breast MRI on 3/25/22 revealed a right spiculated mass posterior and slightly lateral corresponding to the known biopsied carcinoma. In the left lower outer quadrant there was a 12 mm small spiculated mass. MRI-guided biopsy of the left sided mass on 4/8/22 showed usual ductal hyperplasia, fibroadenomatoid changes, stromal fibrosis.\par \par She underwent right breast lumpectomy and sentinel lymph node biopsy on 5/17/22. The pathology showed invasive moderately differentiated ductal carcinoma with focal micropapillary features, measuring 15 mm. DCIS was also present, with intermediate nuclear grade, present in 3 out of 25 blocks.  One right sentinel lymph node was excised and was positive for carcinoma, focus measuring 3 mm, with no extranodal extension. The tumor was close to the inferior margin, but additional shaved margins including the inferior margin were all negative for carcinoma. Oncotype Dx is reportedly pending. \par \par She is without post-operative complications.  She reports right shoulder pain that has been present since August 2021 and has resulting in limited range of motion. She was scheduled to have right shoulder surgery this past April 2022, however, surgery was canceled due to new breast cancer diagnosis and treatment.

## 2022-05-21 NOTE — PHYSICAL EXAM
[Sclera] : the sclera and conjunctiva were normal [] : no respiratory distress [Heart Rate And Rhythm] : heart rate and rhythm were normal [Breast Abnormal Lactation (Galactorrhea)] : no nipple discharge [No UE Edema] : there is no upper extremity edema [Abdomen Soft] : soft [Nondistended] : nondistended [Supraclavicular Lymph Nodes Enlarged Bilaterally] : supraclavicular [Axillary Lymph Nodes Enlarged Bilaterally] : axillary [Normal] : oriented to person, place and time, the affect was normal, the mood was normal and not anxious [de-identified] : Right lumpectomy and axillary scars are clean and dry, no erythema [de-identified] : limited abduction and adduction of right arm, normal strength in right forearm and hand

## 2022-05-21 NOTE — PHYSICAL EXAM
[Sclera] : the sclera and conjunctiva were normal [] : no respiratory distress [Heart Rate And Rhythm] : heart rate and rhythm were normal [Breast Abnormal Lactation (Galactorrhea)] : no nipple discharge [No UE Edema] : there is no upper extremity edema [Abdomen Soft] : soft [Nondistended] : nondistended [Supraclavicular Lymph Nodes Enlarged Bilaterally] : supraclavicular [Axillary Lymph Nodes Enlarged Bilaterally] : axillary [Normal] : oriented to person, place and time, the affect was normal, the mood was normal and not anxious [de-identified] : Right lumpectomy and axillary scars are clean and dry, no erythema [de-identified] : limited abduction and adduction of right arm, normal strength in right forearm and hand

## 2022-05-21 NOTE — VITALS
[Maximal Pain Intensity: 8/10] : 8/10 [Least Pain Intensity: 2/10] : 2/10 [Pain Description/Quality: ___] : Pain description/quality: [unfilled] [Pain Duration: ___] : Pain duration: [unfilled] [Pain Location: ___] : Pain Location: [unfilled] [Pain Interferes with ADLs] : Pain interferes with activities of daily living. [NoTreatment Scheduled] : no treatment scheduled [OTC] : OTC [ECOG Performance Status: 1 - Restricted in physically strenuous activity but ambulatory and able to carry out work of a light or sedentary nature] : Performance Status: 1 - Restricted in physically strenuous activity but ambulatory and able to carry out work of a light or sedentary nature, e.g., light house work, office work [Date: ____________] : Patient's last distress assessment performed on [unfilled]. [5 - Distress Level] : Distress Level: 5 [70: Cares for self; unalbe to carry on normal activity or do active work.] : 70: Cares for self; unable to carry on normal activity or do active work.

## 2022-05-21 NOTE — OB/GYN HISTORY
To get better and follow your care plan as instructed. [Definite:  ___ (Date)] : the last menstrual period was [unfilled] [___] : Full Term: [unfilled] [Currently In Menopause] : not currently in menopause

## 2022-05-21 NOTE — HISTORY OF PRESENT ILLNESS
[FreeTextEntry1] : Ms. Pavon is a 51 year old premenopausal female with [id number] ER 75%, ME 90%, HER 2 negative right breast IDC s/p right breast lumpectomy and SLNB with negative margins, now presenting for consideration of adjuvant radiation.\par \par She had her first mammogram 3 years ago, reportedly normal. More recently had a 2/03/22  screening mammogram that showed focal distortion of the right breast at 6:00, a focal mass in the posterior right upper outer quadrant, and a focal asymmetry in the left posterior central to outer breast. \par \par Bilateral diagnostic mammography and ultrasound on 2/11/22 showed persistent architectural distortion in the central lower right breast corresponding to a 1.4 x 0.7 x .0.7 cm irregular hypoechoic mass seen on ultrasound at 6:00, 5 cm from the nipple. The asymmetry in the left breast effaced and was consistent with overlapping glandular tissue. The asymmetry in the right upper outer breast appeared to correspond to benign appearing cysts seen on ultrasound at 9:00. \par \par Ultrasound guided biopsy of the right breast mass at 6:00 on 3/11/22 well-differentiated ductal carcinoma of the right breast, with Hermitage score 5/9, measuring at least 7 mm. DCIS was also present with cribriform pattern and intermediate nuclear grade. The invasive component was ER 75%, ME > 90%, HER2 equivocal with 2+ membrane staining and negative by CISH. \par \par Breast MRI on 3/25/22 revealed a right spiculated mass posterior and slightly lateral corresponding to the known biopsied carcinoma. In the left lower outer quadrant there was a 12 mm small spiculated mass. MRI-guided biopsy of the left sided mass on 4/8/22 showed usual ductal hyperplasia, fibroadenomatoid changes, stromal fibrosis.\par \par She underwent right breast lumpectomy and sentinel lymph node biopsy on 5/17/22. The pathology showed invasive moderately differentiated ductal carcinoma with focal micropapillary features, measuring 15 mm. DCIS was also present, with intermediate nuclear grade, present in 3 out of 25 blocks.  One right sentinel lymph node was excised and was positive for carcinoma, focus measuring 3 mm, with no extranodal extension. The tumor was close to the inferior margin, but additional shaved margins including the inferior margin were all negative for carcinoma. Oncotype Dx is reportedly pending. \par \par She is without post-operative complications.  She reports right shoulder pain that has been present since August 2021 and has resulting in limited range of motion. She was scheduled to have right shoulder surgery this past April 2022, however, surgery was canceled due to new breast cancer diagnosis and treatment.

## 2022-05-21 NOTE — ADDENDUM
[FreeTextEntry1] : patient subsequently declined referral to Orthopedics as this is a workers compensation case, and she can only use specific surgeons, so she will let me know how I can help.\par LL

## 2022-05-21 NOTE — OB/GYN HISTORY
[Definite:  ___ (Date)] : the last menstrual period was [unfilled] [___] : Full Term: [unfilled] [Currently In Menopause] : not currently in menopause

## 2022-05-23 ENCOUNTER — NON-APPOINTMENT (OUTPATIENT)
Age: 52
End: 2022-05-23

## 2022-06-14 ENCOUNTER — NON-APPOINTMENT (OUTPATIENT)
Age: 52
End: 2022-06-14

## 2022-06-15 ENCOUNTER — OUTPATIENT (OUTPATIENT)
Dept: OUTPATIENT SERVICES | Facility: HOSPITAL | Age: 52
LOS: 1 days | Discharge: HOME | End: 2022-06-15

## 2022-06-15 DIAGNOSIS — Z98.890 OTHER SPECIFIED POSTPROCEDURAL STATES: Chronic | ICD-10-CM

## 2022-06-15 DIAGNOSIS — Z12.39 ENCOUNTER FOR OTHER SCREENING FOR MALIGNANT NEOPLASM OF BREAST: ICD-10-CM

## 2022-06-20 ENCOUNTER — RESULT REVIEW (OUTPATIENT)
Age: 52
End: 2022-06-20

## 2022-06-20 ENCOUNTER — APPOINTMENT (OUTPATIENT)
Dept: HEMATOLOGY ONCOLOGY | Facility: CLINIC | Age: 52
End: 2022-06-20
Payer: MEDICAID

## 2022-06-20 ENCOUNTER — NON-APPOINTMENT (OUTPATIENT)
Age: 52
End: 2022-06-20

## 2022-06-20 VITALS
HEART RATE: 100 BPM | OXYGEN SATURATION: 99 % | RESPIRATION RATE: 16 BRPM | HEIGHT: 57.95 IN | SYSTOLIC BLOOD PRESSURE: 136 MMHG | TEMPERATURE: 98.4 F | WEIGHT: 124.78 LBS | DIASTOLIC BLOOD PRESSURE: 90 MMHG | BODY MASS INDEX: 26.19 KG/M2

## 2022-06-20 LAB
ALBUMIN SERPL ELPH-MCNC: 4.3 G/DL
ALP BLD-CCNC: 43 U/L
ALT SERPL-CCNC: 21 U/L
ANION GAP SERPL CALC-SCNC: 9 MMOL/L
AST SERPL-CCNC: 19 U/L
BASOPHILS # BLD AUTO: 0.01 K/UL — SIGNIFICANT CHANGE UP (ref 0–0.2)
BASOPHILS NFR BLD AUTO: 0.2 % — SIGNIFICANT CHANGE UP (ref 0–2)
BILIRUB SERPL-MCNC: 0.6 MG/DL
BUN SERPL-MCNC: 10 MG/DL
CALCIUM SERPL-MCNC: 9.3 MG/DL
CHLORIDE SERPL-SCNC: 106 MMOL/L
CO2 SERPL-SCNC: 26 MMOL/L
CREAT SERPL-MCNC: 0.69 MG/DL
EGFR: 104 ML/MIN/1.73M2
EOSINOPHIL # BLD AUTO: 0.21 K/UL — SIGNIFICANT CHANGE UP (ref 0–0.5)
EOSINOPHIL NFR BLD AUTO: 3.5 % — SIGNIFICANT CHANGE UP (ref 0–6)
GLUCOSE SERPL-MCNC: 96 MG/DL
HCT VFR BLD CALC: 36.2 % — SIGNIFICANT CHANGE UP (ref 34.5–45)
HGB BLD-MCNC: 11.7 G/DL — SIGNIFICANT CHANGE UP (ref 11.5–15.5)
IMM GRANULOCYTES NFR BLD AUTO: 0.2 % — SIGNIFICANT CHANGE UP (ref 0–1.5)
LYMPHOCYTES # BLD AUTO: 1.27 K/UL — SIGNIFICANT CHANGE UP (ref 1–3.3)
LYMPHOCYTES # BLD AUTO: 21.5 % — SIGNIFICANT CHANGE UP (ref 13–44)
MCHC RBC-ENTMCNC: 26.2 PG — LOW (ref 27–34)
MCHC RBC-ENTMCNC: 32.3 G/DL — SIGNIFICANT CHANGE UP (ref 32–36)
MCV RBC AUTO: 81.2 FL — SIGNIFICANT CHANGE UP (ref 80–100)
MONOCYTES # BLD AUTO: 0.58 K/UL — SIGNIFICANT CHANGE UP (ref 0–0.9)
MONOCYTES NFR BLD AUTO: 9.8 % — SIGNIFICANT CHANGE UP (ref 2–14)
NEUTROPHILS # BLD AUTO: 3.84 K/UL — SIGNIFICANT CHANGE UP (ref 1.8–7.4)
NEUTROPHILS NFR BLD AUTO: 64.8 % — SIGNIFICANT CHANGE UP (ref 43–77)
NRBC # BLD: 0 /100 WBCS — SIGNIFICANT CHANGE UP (ref 0–0)
PLATELET # BLD AUTO: 260 K/UL — SIGNIFICANT CHANGE UP (ref 150–400)
POTASSIUM SERPL-SCNC: 3.9 MMOL/L
PROT SERPL-MCNC: 7.3 G/DL
RBC # BLD: 4.46 M/UL — SIGNIFICANT CHANGE UP (ref 3.8–5.2)
RBC # FLD: 15.4 % — HIGH (ref 10.3–14.5)
SODIUM SERPL-SCNC: 141 MMOL/L
WBC # BLD: 5.92 K/UL — SIGNIFICANT CHANGE UP (ref 3.8–10.5)
WBC # FLD AUTO: 5.92 K/UL — SIGNIFICANT CHANGE UP (ref 3.8–10.5)

## 2022-06-20 PROCEDURE — 99204 OFFICE O/P NEW MOD 45 MIN: CPT

## 2022-06-20 RX ORDER — NITROFURANTOIN (MONOHYDRATE/MACROCRYSTALS) 25; 75 MG/1; MG/1
100 CAPSULE ORAL
Qty: 14 | Refills: 0 | Status: DISCONTINUED | COMMUNITY
Start: 2022-03-11

## 2022-06-20 RX ORDER — OXYCODONE 5 MG/1
5 TABLET ORAL
Qty: 4 | Refills: 0 | Status: DISCONTINUED | COMMUNITY
Start: 2022-05-02

## 2022-06-20 NOTE — PHYSICAL EXAM
[Normal] : affect appropriate [de-identified] : no dominant mass, nipple retration or discharge. [de-identified] : decreased ROM right shoulder

## 2022-06-20 NOTE — HISTORY OF PRESENT ILLNESS
[Disease: _____________________] : Disease: [unfilled] [T: ___] : T[unfilled] [N: ___] : N[unfilled] [AJCC Stage: ____] : AJCC Stage: [unfilled] [de-identified] : Patient presenting at the request for breast surgeon for oncology consultation for breast cancer.\par \par 2/4/2022–Screening mammogram revealed a focal distortion 6:00 right breast, focal mass posterior right upper outer quadrant and focal asymmetry left breast.\par \par 5/11/2022–DIagnostic mammogram and breast ultrasound revealed suspicious irregular hypoechoic mass at 6:00 right breast, corresponding with the mammographic distortion.\par \par 3/11/2022–RIght breast ultrasound-guided core biopsy, 6:00–invasive well-differentiated ductal carcinoma measuring at least 0.7 cm.  DCIS, cribriform pattern with intermediate nuclear grade.  No LVI.  ER positive (greater than 75%, moderate)/[place] positive (greater than 90%, strong)/HER2 equivocal (2+) with follow-up CISH testing negative (non-amplified).\par \par 3/25/2022–Breast MRI–spiculated enhancing mass right breast corresponding to the known previously biopsied carcinoma.  Scattered progressively enhancing foci without suspicious region.  Negative for right axillary adenopathy.  Left breast showed a small spiculated enhancing mass in the lower outer quadrant measuring 12 mm for which biopsy recommended.\par \par 4/8/2022–Left breast MRI guided biopsy–usual ductal hyperplasia, fibroadenomatoid changes and stromal fibrosis.\par \par 5/17/2022–Status post right breast lumpectomy with pathology revealing an invasive moderately differentiated ductal carcinoma with focal micropapillary features, 15 mm.  DCIS.  Margins negative.  1 sentinel lymph node positive with macrometastasis, largest metastatic deposit 3 mm.\par \par Patient has recoverd from her breast surgery.\par No current pulmonary/GI//neurologic complaints.  No fevers.  No bleeding.\par Planning to have right shoulder surgery prior to RT-has "torn" muscle. Surgery scheduled 7/11/22.\par \par Did not have COVID vaccines.\par Son Kit present. [de-identified] : Invasive ductal carcinoma [de-identified] : ER+/WA+/Her2- [de-identified] : Oncotype Dx. recurrence score of 8, suggesting no apparent benefit for group average absolute chemotherapy benefit.

## 2022-06-20 NOTE — ASSESSMENT
[FreeTextEntry1] : Mammogram/breast ultrasound/breast MRI, breast pathology reports, Dr. Regalado's 5/19/2022 radiation oncology note, Dr. Rodríguez's 5/11/2022 surgery note reviewed.  \par Stage IIA ([id number]) right breast cancer, status post right breast conservation surgery/axillary lymph node dissection.  ER positive/FL positive/HER2 CISH negative. Oncotype Dx. recurrence score of 8.\par Reviewed diagnosis/prognosis with cancer recurrence risks and management options.  Discussed roles of surgery/radiation/systemic therapy in breast cancer management.\par Explained Oncotype DX report.\par Considering her premenopausal status (though menses starting to get irregular), discussed with patient that there may be an absolute benefit of chemotherapy with regard to improving 5 year disease free survival with an Oncotype Dx. recurrence score of 8 of up to ~ 5%, and OS survival improvement of up to ~ 2 %..  As patient is greater than 45 years old, it is unclear if the benefit of chemotherapy may be due to chemotherapy-induced premature menopause.  Ovarian suppression along with an aromatase inhibitor is a reasonable alternative per NCCN guidelines. \par Potential benefits/side effects of chemotherapy and endocrine therapy reviewed.\par Offered to present case at multidisciplinary TB, though patient stated she wishes to proceed with RT/endocrine therapy (and not have me present her case, as she wishes to hold off on chemotherapy).\par \par Having opted for breast conservation surgery, patient is a candidate for adjuvant radiation, and has seen radiation oncology in preparation for this.  She would receive AI therapy following radiation. Lupron now, as RT is being delayed due to shoulder surgery planned.\par Potential side effects of Lupron/exemestane explained including but not limited to rendering her postmenopausal, hot flashes, hypertension/vascular toxicity/edema, arthralgias, osteoporosis, pain at Lupron injection site, musculoskeletal pain, allergic reaction, emotional lability.  Patient gave consent for endocrine therapy. \par She will need a baseline bone densitometry.\par Case d/w Dr. Regalado today.\par \par Patient was given the opportunity to ask questions.  Her questions have been answered to her apparent satisfaction at this time.  She expressed her understanding and willingness to comply with recommended follow-up.\par \par

## 2022-06-20 NOTE — CONSULT LETTER
[Dear  ___] : Dear  [unfilled], [Consult Letter:] : I had the pleasure of evaluating your patient, [unfilled]. [Please see my note below.] : Please see my note below. [Consult Closing:] : Thank you very much for allowing me to participate in the care of this patient.  If you have any questions, please do not hesitate to contact me. [Sincerely,] : Sincerely, [FreeTextEntry3] : Shaniqua Elliott MD

## 2022-06-27 ENCOUNTER — APPOINTMENT (OUTPATIENT)
Dept: INFUSION THERAPY | Facility: HOSPITAL | Age: 52
End: 2022-06-27

## 2022-06-28 ENCOUNTER — NON-APPOINTMENT (OUTPATIENT)
Age: 52
End: 2022-06-28

## 2022-06-28 DIAGNOSIS — C50.919 MALIGNANT NEOPLASM OF UNSPECIFIED SITE OF UNSPECIFIED FEMALE BREAST: ICD-10-CM

## 2022-07-06 ENCOUNTER — NON-APPOINTMENT (OUTPATIENT)
Age: 52
End: 2022-07-06

## 2022-07-26 ENCOUNTER — APPOINTMENT (OUTPATIENT)
Dept: INFUSION THERAPY | Facility: HOSPITAL | Age: 52
End: 2022-07-26

## 2022-07-26 ENCOUNTER — APPOINTMENT (OUTPATIENT)
Dept: HEMATOLOGY ONCOLOGY | Facility: CLINIC | Age: 52
End: 2022-07-26

## 2022-07-26 VITALS
TEMPERATURE: 98.2 F | OXYGEN SATURATION: 99 % | WEIGHT: 122.55 LBS | RESPIRATION RATE: 16 BRPM | SYSTOLIC BLOOD PRESSURE: 149 MMHG | HEART RATE: 97 BPM | BODY MASS INDEX: 25.66 KG/M2 | DIASTOLIC BLOOD PRESSURE: 89 MMHG

## 2022-07-26 PROCEDURE — 99214 OFFICE O/P EST MOD 30 MIN: CPT

## 2022-07-26 RX ORDER — LEUPROLIDE ACETATE 1 MG/0.2ML
VIAL (ML) SUBCUTANEOUS
Refills: 0 | Status: ACTIVE | COMMUNITY

## 2022-07-26 NOTE — ASSESSMENT
[FreeTextEntry1] : Lab work reviewed.\par Stage IIA ([id number]) right breast cancer, status post right breast conservation surgery/axillary lymph node dissection.  ER positive/[place] positive/HER2 CISH negative. Oncotype Dx. recurrence score of 8.\par Considering her premenopausal status, had discussed with patient that there may be an absolute benefit of chemotherapy with regard to improving 5 year disease free survival with an Oncotype Dx. recurrence score of 8 of up to ~ 5%, and OS survival improvement of up to ~ 2 %.  As patient is greater than 45 years old, it is unclear if the benefit of chemotherapy may be due to chemotherapy-induced premature menopause.  Ovarian suppression along with an aromatase inhibitor is a reasonable alternative per NCCN guidelines. \par Patient opted to proceed with RT/endocrine therapy( and wished to hold off on chemotherapy).\par \par Having opted for breast conservation surgery, patient is a candidate for adjuvant radiation, and has seen radiation oncology in preparation for this.  She would receive AI therapy following radiation. Lupron to continue.\par BDT to be done.\par \par Patient was given the opportunity to ask questions.  Her questions have been answered to her apparent satisfaction at this time.  She expressed her understanding and willingness to comply with recommended follow-up.\par \par

## 2022-07-26 NOTE — PHYSICAL EXAM
[Normal] : affect appropriate [de-identified] : no dominant mass, nipple retraction or discharge. [de-identified] : decreased ROM right shoulder

## 2022-07-26 NOTE — HISTORY OF PRESENT ILLNESS
[Disease: _____________________] : Disease: [unfilled] [T: ___] : T[unfilled] [N: ___] : N[unfilled] [AJCC Stage: ____] : AJCC Stage: [unfilled] [0 - No Distress] : Distress Level: 0 [de-identified] : Invasive ductal carcinoma [de-identified] : 2/4/2022–Screening mammogram revealed a focal distortion 6:00 right breast, focal mass posterior right upper outer quadrant and focal asymmetry left breast.\par \par 5/11/2022–DIagnostic mammogram and breast ultrasound revealed suspicious irregular hypoechoic mass at 6:00 right breast, corresponding with the mammographic distortion.\par \par 3/11/2022–RIght breast ultrasound-guided core biopsy, 6:00–invasive well-differentiated ductal carcinoma measuring at least 0.7 cm.  DCIS, cribriform pattern with intermediate nuclear grade.  No LVI.  ER positive (greater than 75%, moderate)/[place] positive (greater than 90%, strong)/HER2 equivocal (2+) with follow-up CISH testing negative (non-amplified).\par \par 3/25/2022–Breast MRI–spiculated enhancing mass right breast corresponding to the known previously biopsied carcinoma.  Scattered progressively enhancing foci without suspicious region.  Negative for right axillary adenopathy.  Left breast showed a small spiculated enhancing mass in the lower outer quadrant measuring 12 mm for which biopsy recommended.\par \par 4/8/2022–Left breast MRI guided biopsy–usual ductal hyperplasia, fibroadenomatoid changes and stromal fibrosis.\par \par 5/17/2022–Status post right breast lumpectomy with pathology revealing an invasive moderately differentiated ductal carcinoma with focal micropapillary features, 15 mm.  DCIS.  Margins negative.  1 sentinel lymph node positive with macrometastasis, largest metastatic deposit 3 mm.\par \par 6/27/2022-Began Lupron. [de-identified] : ER+/MS+/Her2- [de-identified] : Oncotype Dx. recurrence score of 8, suggesting no apparent benefit for group average absolute chemotherapy benefit. [de-identified] : Right shoulder surgery postponed pending f/u MRI.. \par Radiation oncology f/u planned-to start adjuvant RT post shoulder surgery.\par No current pulmonary/GI//neurologic complaints.  No fevers.  No bleeding.\par \par Did not have COVID vaccines.\par Son Kit present.

## 2022-07-27 PROBLEM — M25.519 PAIN IN UNSPECIFIED SHOULDER: Chronic | Status: ACTIVE | Noted: 2022-06-24

## 2022-07-27 PROBLEM — R14.1 GAS PAIN: Chronic | Status: ACTIVE | Noted: 2022-06-24

## 2022-07-27 PROBLEM — R10.2 PELVIC AND PERINEAL PAIN: Chronic | Status: ACTIVE | Noted: 2022-06-24

## 2022-07-27 PROBLEM — N80.0 ENDOMETRIOSIS OF UTERUS: Chronic | Status: ACTIVE | Noted: 2022-06-24

## 2022-07-27 PROBLEM — D21.9 BENIGN NEOPLASM OF CONNECTIVE AND OTHER SOFT TISSUE, UNSPECIFIED: Chronic | Status: ACTIVE | Noted: 2022-06-24

## 2022-07-27 PROBLEM — N92.0 EXCESSIVE AND FREQUENT MENSTRUATION WITH REGULAR CYCLE: Chronic | Status: ACTIVE | Noted: 2022-06-24

## 2022-07-28 ENCOUNTER — APPOINTMENT (OUTPATIENT)
Dept: RADIATION ONCOLOGY | Facility: CLINIC | Age: 52
End: 2022-07-28

## 2022-07-28 VITALS
WEIGHT: 127.38 LBS | DIASTOLIC BLOOD PRESSURE: 88 MMHG | SYSTOLIC BLOOD PRESSURE: 144 MMHG | BODY MASS INDEX: 27.48 KG/M2 | HEART RATE: 86 BPM | RESPIRATION RATE: 17 BRPM | HEIGHT: 57 IN | TEMPERATURE: 96.98 F | OXYGEN SATURATION: 98 %

## 2022-07-28 PROCEDURE — 99212 OFFICE O/P EST SF 10 MIN: CPT

## 2022-07-28 NOTE — VITALS
[Maximal Pain Intensity: 8/10] : 8/10 [Least Pain Intensity: 1/10] : 1/10 [Pain Duration: ___] : Pain duration: [unfilled] [Pain Location: ___] : Pain Location: [unfilled] [OTC] : OTC [70: Cares for self; unalbe to carry on normal activity or do active work.] : 70: Cares for self; unable to carry on normal activity or do active work.

## 2022-08-02 ENCOUNTER — OUTPATIENT (OUTPATIENT)
Dept: OUTPATIENT SERVICES | Facility: HOSPITAL | Age: 52
LOS: 1 days | End: 2022-08-02
Payer: MEDICAID

## 2022-08-02 ENCOUNTER — APPOINTMENT (OUTPATIENT)
Dept: RADIOLOGY | Facility: IMAGING CENTER | Age: 52
End: 2022-08-02

## 2022-08-02 DIAGNOSIS — Z98.890 OTHER SPECIFIED POSTPROCEDURAL STATES: Chronic | ICD-10-CM

## 2022-08-02 DIAGNOSIS — C50.911 MALIGNANT NEOPLASM OF UNSPECIFIED SITE OF RIGHT FEMALE BREAST: ICD-10-CM

## 2022-08-02 PROCEDURE — 77080 DXA BONE DENSITY AXIAL: CPT | Mod: 26

## 2022-08-02 PROCEDURE — 77080 DXA BONE DENSITY AXIAL: CPT

## 2022-08-03 ENCOUNTER — NON-APPOINTMENT (OUTPATIENT)
Age: 52
End: 2022-08-03

## 2022-08-03 NOTE — PHYSICAL EXAM
[General Appearance - Well Developed] : well developed [] : no respiratory distress [No Focal Deficits] : no focal deficits [Normal] : oriented to person, place and time, the affect was normal, the mood was normal and not anxious [Sclera] : the sclera and conjunctiva were normal [Heart Rate And Rhythm] : heart rate and rhythm were normal [Breast Palpation Mass] : no palpable masses [No UE Edema] : there is no upper extremity edema [de-identified] : Right lumpectomy and axillary scars are well healed, no erythema, no hyperpigmentation.  [de-identified] : ROM of right arm limited by pain

## 2022-08-03 NOTE — HISTORY OF PRESENT ILLNESS
[FreeTextEntry1] : Ms. Pavon is a 51 year old premenopausal female with stage IB, [id number] moderately differentiated invasive ductal carcinoma of the right breast, ER/MD positive and HER2 negative. She underwent lumpectomy with negative margins. She had one sentinel LN positive with macrometastasis, largest 3 mm.  Oncotype Dx score was 8. She was unable to proceed with RT due to right shoulder pain. She has received Lupron and plans for AI. During her consult in 5/2022, it was determined that her right shoulder likely needs to be repaired prior to receiving radiation therapy since she can barely move her arm. \par \par She met with orthopedic in [place]. Plans for MRI right shoulder within the week, and then plans for surgery, but she does not have a date yet.  She is unsure of the diagnosis, but told it was a "shoulder tear" and she needs surgery.  She injured her shoulder last August 2021 while working as [employer], she was lifting a patient and had the acute injury. She takes advil and tylenol as needed.  She has tried PT in the past, without results.  She saw 3 orthopedics, all said she needs surgery; however, she had insurance clearance issues, and then was diagnosed with breast cancer.\par

## 2022-08-12 ENCOUNTER — NON-APPOINTMENT (OUTPATIENT)
Age: 52
End: 2022-08-12

## 2022-08-15 ENCOUNTER — NON-APPOINTMENT (OUTPATIENT)
Age: 52
End: 2022-08-15

## 2022-08-17 ENCOUNTER — OUTPATIENT (OUTPATIENT)
Dept: OUTPATIENT SERVICES | Facility: HOSPITAL | Age: 52
LOS: 1 days | Discharge: HOME | End: 2022-08-17

## 2022-08-17 DIAGNOSIS — Z12.39 ENCOUNTER FOR OTHER SCREENING FOR MALIGNANT NEOPLASM OF BREAST: ICD-10-CM

## 2022-08-17 DIAGNOSIS — Z98.890 OTHER SPECIFIED POSTPROCEDURAL STATES: Chronic | ICD-10-CM

## 2022-08-18 PROBLEM — Z92.89 HISTORY OF BONE DENSITY STUDY: Status: RESOLVED | Noted: 2022-08-02 | Resolved: 2022-08-18

## 2022-08-22 ENCOUNTER — APPOINTMENT (OUTPATIENT)
Dept: INFUSION THERAPY | Facility: HOSPITAL | Age: 52
End: 2022-08-22

## 2022-08-22 ENCOUNTER — NON-APPOINTMENT (OUTPATIENT)
Age: 52
End: 2022-08-22

## 2022-08-22 ENCOUNTER — APPOINTMENT (OUTPATIENT)
Dept: HEMATOLOGY ONCOLOGY | Facility: CLINIC | Age: 52
End: 2022-08-22

## 2022-08-22 VITALS
HEART RATE: 98 BPM | DIASTOLIC BLOOD PRESSURE: 102 MMHG | OXYGEN SATURATION: 98 % | TEMPERATURE: 97.7 F | HEIGHT: 56.97 IN | BODY MASS INDEX: 26.35 KG/M2 | RESPIRATION RATE: 16 BRPM | SYSTOLIC BLOOD PRESSURE: 158 MMHG | WEIGHT: 122.14 LBS

## 2022-08-22 DIAGNOSIS — Z92.89 PERSONAL HISTORY OF OTHER MEDICAL TREATMENT: ICD-10-CM

## 2022-08-22 PROCEDURE — 99214 OFFICE O/P EST MOD 30 MIN: CPT

## 2022-08-22 NOTE — ASSESSMENT
[FreeTextEntry1] : Lab work, BDT report reviewed.\par Stage IIA ([id number]) right breast cancer, status post right breast conservation surgery/axillary lymph node dissection.  ER positive/[place] positive/HER2 CISH negative. Oncotype Dx. recurrence score of 8.\par Considering her premenopausal status, had discussed with patient that there may be an absolute benefit of chemotherapy with regard to improving 5 year disease free survival with an Oncotype Dx. recurrence score of 8 of up to ~ 5%, and OS survival improvement of up to ~ 2 %.  As patient is greater than 45 years old, it is unclear if the benefit of chemotherapy may be due to chemotherapy-induced premature menopause.  Ovarian suppression along with an aromatase inhibitor is a reasonable alternative per NCCN guidelines. \par Patient opted to proceed with RT/endocrine therapy( and wished to hold off on chemotherapy).\par \par Radiation plans pending. She would receive AI therapy following radiation. Lupron to continue.\par BDT 8/2022 WNL.\par \par Patient was given the opportunity to ask questions.  Her questions have been answered to her apparent satisfaction at this time.  She expressed her understanding and willingness to comply with recommended follow-up.\par \par

## 2022-08-22 NOTE — HISTORY OF PRESENT ILLNESS
[Disease: _____________________] : Disease: [unfilled] [T: ___] : T[unfilled] [N: ___] : N[unfilled] [AJCC Stage: ____] : AJCC Stage: [unfilled] [de-identified] : 2/4/2022–Screening mammogram revealed a focal distortion 6:00 right breast, focal mass posterior right upper outer quadrant and focal asymmetry left breast.\par \par 5/11/2022–DIagnostic mammogram and breast ultrasound revealed suspicious irregular hypoechoic mass at 6:00 right breast, corresponding with the mammographic distortion.\par \par 3/11/2022–RIght breast ultrasound-guided core biopsy, 6:00–invasive well-differentiated ductal carcinoma measuring at least 0.7 cm.  DCIS, cribriform pattern with intermediate nuclear grade.  No LVI.  ER positive (greater than 75%, moderate)/ME positive (greater than 90%, strong)/HER2 equivocal (2+) with follow-up CISH testing negative (non-amplified).\par \par 3/25/2022–Breast MRI–spiculated enhancing mass right breast corresponding to the known previously biopsied carcinoma.  Scattered progressively enhancing foci without suspicious region.  Negative for right axillary adenopathy.  Left breast showed a small spiculated enhancing mass in the lower outer quadrant measuring 12 mm for which biopsy recommended.\par \par 4/8/2022–Left breast MRI guided biopsy–usual ductal hyperplasia, fibroadenomatoid changes and stromal fibrosis.\par \par 5/17/2022–Status post right breast lumpectomy with pathology revealing an invasive moderately differentiated ductal carcinoma with focal micropapillary features, 15 mm.  DCIS.  Margins negative.  1 sentinel lymph node positive with macrometastasis, largest metastatic deposit 3 mm.\par \par 6/27/2022-Began Lupron. [de-identified] : Invasive ductal carcinoma [de-identified] : ER+/AR+/Her2- [de-identified] : Oncotype Dx. recurrence score of 8, suggesting no apparent benefit for group average absolute chemotherapy benefit. [de-identified] : Right shoulder surgery decision pending. \par Radiation oncology f/u planned-to start adjuvant RT post shoulder surgery if surgery to be done.\par No current pulmonary/GI//neurologic complaints.  No fevers.  No bleeding.\par \par Did not have COVID vaccines.\par

## 2022-08-22 NOTE — PHYSICAL EXAM
[Fully active, able to carry on all pre-disease performance without restriction] : Status 0 - Fully active, able to carry on all pre-disease performance without restriction [Normal] : affect appropriate [de-identified] : no dominant mass, nipple retraction or discharge. [de-identified] : decreased ROM right shoulder

## 2022-08-23 DIAGNOSIS — Z51.11 ENCOUNTER FOR ANTINEOPLASTIC CHEMOTHERAPY: ICD-10-CM

## 2022-08-23 DIAGNOSIS — C50.919 MALIGNANT NEOPLASM OF UNSPECIFIED SITE OF UNSPECIFIED FEMALE BREAST: ICD-10-CM

## 2022-08-25 ENCOUNTER — OUTPATIENT (OUTPATIENT)
Dept: OUTPATIENT SERVICES | Facility: HOSPITAL | Age: 52
LOS: 1 days | Discharge: HOME | End: 2022-08-25

## 2022-08-25 DIAGNOSIS — Z98.890 OTHER SPECIFIED POSTPROCEDURAL STATES: Chronic | ICD-10-CM

## 2022-08-25 PROCEDURE — 77263 THER RADIOLOGY TX PLNG CPLX: CPT

## 2022-08-30 ENCOUNTER — APPOINTMENT (OUTPATIENT)
Dept: SURGICAL ONCOLOGY | Facility: CLINIC | Age: 52
End: 2022-08-30

## 2022-08-30 VITALS
WEIGHT: 122 LBS | SYSTOLIC BLOOD PRESSURE: 154 MMHG | BODY MASS INDEX: 26.32 KG/M2 | HEART RATE: 91 BPM | HEIGHT: 56.97 IN | DIASTOLIC BLOOD PRESSURE: 92 MMHG

## 2022-08-30 PROCEDURE — 99213 OFFICE O/P EST LOW 20 MIN: CPT

## 2022-08-30 NOTE — CONSULT LETTER
[Dear  ___] : Dear  [unfilled], [Courtesy Letter:] : I had the pleasure of seeing your patient, [unfilled], in my office today. [Please see my note below.] : Please see my note below. [Consult Closing:] : Thank you very much for allowing me to participate in the care of this patient.  If you have any questions, please do not hesitate to contact me. [Sincerely,] : Sincerely, [FreeTextEntry3] : Traci Rodríguez MD\par Breast Surgeon\par Division of Surgical Oncology\par Department of Surgery\par [address] [place] [address]\[place] [address] \par Tel: [phone]\par Fax: [phone]\par Email: [email or web address]

## 2022-08-30 NOTE — ASSESSMENT
[FreeTextEntry1] : The patient is a 52 year old female referred for consultation by Dr. Estiven August for Right breast IDC now s/p Right breast lumpectomy and SLNB 5/2/2022.\par \par 5/2/2022 R breast lumpectomy, R SLNB\par - R SLNB (1/1) positive for carcinoma, 3mm. No extranodel extension.\par - R breast: IDC, moderately differentiated w/ focal micropapillary features, 15mm. DCIS, intermediate nuclear grade, solid, cribriform and micropapillary type, w/ calcs and focal necrosis. Lymphovascular invasion is identified. Sclerosing duct papilloma. Prior biopsy site changes. The invasive carcinoma is very close to, <1 mm, the designated inferior margin of the specimen.\par -R margins: all negative\par -AJCC Staging: [id number]\par -ODX 8\par \par 6/27/2022: Began Lupron\par \par 8/2/2022: Bone density: WNL\par \par Exam shows well-healed incisions. No evidence of infection.\par \par Plan:\par  - F/u Rad onc - RT now\par  - med onc f/u after RT\par  - Next breast imaging 2/2023 B/L SM and US\par  - RTO after imaging

## 2022-08-30 NOTE — PHYSICAL EXAM
[Normocephalic] : normocephalic [Atraumatic] : atraumatic [EOMI] : extra ocular movement intact [PERRL] : pupils equal, round and reactive to light [Sclera nonicteric] : sclera nonicteric [Bra Size: ___] : Bra Size: [unfilled] [Grade 1] : Ptosis Grade 1 [No dominant masses] : no dominant masses in right breast  [No Nipple Retraction] : no right nipple retraction [No Nipple Discharge] : no right nipple discharge [No Axillary Lymphadenopathy] : no right axillary lymphadenopathy [No Edema] : no edema [No Rashes] : no rashes [No Ulceration] : no ulceration [de-identified] : non-labored respirations  [de-identified] : circumareolar incision well-healed [de-identified] : Incision healing well, some induration, no fluctuance

## 2022-08-30 NOTE — HISTORY OF PRESENT ILLNESS
[FreeTextEntry1] : The patient is a 52 year old female referred for consultation by Dr. Estiven August for Right breast IDC now s/p Right breast lumpectomy and SLNB 5/2/2022.\par \par Prior history:\par The patient reports that she does not get regular screening. Her first mammogram was 3 years ago, reportedly normal. Her most recent imaging showed:\par \par 2/03/2022 B/L SM (NW) TC 5.7%, scattered fibroglandular densities \par  - R breast 6:00 focal distortion -> DM and US\par  - R UOQ focal mass -> US  \par  - L far posterior central to slightly outer breast -> DM and US\par  - BR0\par \par 2/11/2022 B/L DM\par  - R central lower breast 6:00 persistent architectural distortion\par  - L slightly outer asymmetry predominantly effaces\par  - L breast asymmetry not seen on ML view\par \par 2/11/2022 B/L US\par  - R 6:00 N5 1.4 x 0.7 x 0.7 cm irregular hypoechoic mass -> BX\par  - R 9:00 N10 6 mm benign-appearing IMLN\par  - R 9:00 N9 8 mm benign-appearing complicated cyst; adjacent 4 mm complicated cyst\par  - L negative\par  - BR4C\par \par 3/11/2022 R USG-CNB\par  - R 6:00 N5 (coil): IDC, G1, DCIS\par  - ER >75% mod, [place] >90% strong, HER2 2+/FISH negative (ratio 1.0, copy 1.8)\par \par 3/25/2022 MRI\par  - R spiculated mass posterior and slightly lateral to biopsied carcinoma\par  - L lower outer quadrant 12 mm small spiculated mass -> BX\par  - B/L LN negative\par  - BR6\par \par The patient reports that she has not had the left breast biopsied. She does not feel the mass. Otherwise has no breast complaints.\par \par She denies any medical or surgical history. She takes no medications\par Denies any family history of breast cancer. Has a maternal aunt with lung cancer.\par \par 4/8/2022 L MR bx\par  - L spiculated mass (hourglass clip): UDH, fibroadenomatoid changes, stromal fibrosis, benign concordant\par \par 5/2/2022 R breast lumpectomy, R SLNB\par - R SLNB (1/1) positive for carcinoma, 3mm. No extranodel extension.\par - R breast: IDC, moderately differentiated w/ focal micropapillary features, 15mm. DCIS, intermediate nuclear grade, solid, cribriform and micropapillary type, w/ calcs and focal necrosis. Lymphovascular invasion is identified. Sclerosing duct papilloma. Prior biopsy site changes. The invasive carcinoma is very close to, <1 mm, the designated inferior margin of the specimen.\par -R margins: all negative\par -AJCC Staging: [id number]\par -ODX 8\par \par 5/11/2022:\par The patient reports taking Tylenol immediately after surgery, but does not require anymore. She has some right shoulder pain, but this is longstanding. Is waiting to schedule shoulder surgery.\par \par 6/27/2022: Began Lupron\par \par 8/2/2022: Bone density: WNL\par \par Interval History: pt met with med onc--no plan for chemotherapy. Radiation plan was on hold until followup with orthopedic surgeon regarding frozen shoulder. Most recent note indicates plan for PT now, and pt should proceed with RT. Planned to start RT tomorrow. Still feels some tenderness in the right breast.

## 2022-08-31 ENCOUNTER — NON-APPOINTMENT (OUTPATIENT)
Age: 52
End: 2022-08-31

## 2022-08-31 PROCEDURE — 77333 RADIATION TREATMENT AID(S): CPT | Mod: 26

## 2022-08-31 PROCEDURE — 77290 THER RAD SIMULAJ FIELD CPLX: CPT | Mod: 26

## 2022-09-09 PROCEDURE — 77295 3-D RADIOTHERAPY PLAN: CPT | Mod: 26

## 2022-09-09 PROCEDURE — 77334 RADIATION TREATMENT AID(S): CPT | Mod: 26

## 2022-09-09 PROCEDURE — 77300 RADIATION THERAPY DOSE PLAN: CPT | Mod: 26

## 2022-09-14 PROCEDURE — 77280 THER RAD SIMULAJ FIELD SMPL: CPT | Mod: 26

## 2022-09-19 ENCOUNTER — RESULT REVIEW (OUTPATIENT)
Age: 52
End: 2022-09-19

## 2022-09-19 ENCOUNTER — APPOINTMENT (OUTPATIENT)
Dept: INFUSION THERAPY | Facility: HOSPITAL | Age: 52
End: 2022-09-19

## 2022-09-19 ENCOUNTER — APPOINTMENT (OUTPATIENT)
Dept: HEMATOLOGY ONCOLOGY | Facility: CLINIC | Age: 52
End: 2022-09-19

## 2022-09-19 ENCOUNTER — NON-APPOINTMENT (OUTPATIENT)
Age: 52
End: 2022-09-19

## 2022-09-19 VITALS
TEMPERATURE: 97.7 F | RESPIRATION RATE: 16 BRPM | SYSTOLIC BLOOD PRESSURE: 152 MMHG | HEART RATE: 88 BPM | HEIGHT: 56.97 IN | DIASTOLIC BLOOD PRESSURE: 93 MMHG | BODY MASS INDEX: 26.63 KG/M2 | WEIGHT: 123.46 LBS | OXYGEN SATURATION: 98 %

## 2022-09-19 LAB
BASOPHILS # BLD AUTO: 0.01 K/UL — SIGNIFICANT CHANGE UP (ref 0–0.2)
BASOPHILS NFR BLD AUTO: 0.2 % — SIGNIFICANT CHANGE UP (ref 0–2)
EOSINOPHIL # BLD AUTO: 0.11 K/UL — SIGNIFICANT CHANGE UP (ref 0–0.5)
EOSINOPHIL NFR BLD AUTO: 2 % — SIGNIFICANT CHANGE UP (ref 0–6)
HCT VFR BLD CALC: 39.5 % — SIGNIFICANT CHANGE UP (ref 34.5–45)
HGB BLD-MCNC: 12.7 G/DL — SIGNIFICANT CHANGE UP (ref 11.5–15.5)
IMM GRANULOCYTES NFR BLD AUTO: 0.2 % — SIGNIFICANT CHANGE UP (ref 0–0.9)
LYMPHOCYTES # BLD AUTO: 2.06 K/UL — SIGNIFICANT CHANGE UP (ref 1–3.3)
LYMPHOCYTES # BLD AUTO: 36.9 % — SIGNIFICANT CHANGE UP (ref 13–44)
MCHC RBC-ENTMCNC: 26.9 PG — LOW (ref 27–34)
MCHC RBC-ENTMCNC: 32.2 G/DL — SIGNIFICANT CHANGE UP (ref 32–36)
MCV RBC AUTO: 83.7 FL — SIGNIFICANT CHANGE UP (ref 80–100)
MONOCYTES # BLD AUTO: 0.37 K/UL — SIGNIFICANT CHANGE UP (ref 0–0.9)
MONOCYTES NFR BLD AUTO: 6.6 % — SIGNIFICANT CHANGE UP (ref 2–14)
NEUTROPHILS # BLD AUTO: 3.03 K/UL — SIGNIFICANT CHANGE UP (ref 1.8–7.4)
NEUTROPHILS NFR BLD AUTO: 54.1 % — SIGNIFICANT CHANGE UP (ref 43–77)
NRBC # BLD: 0 /100 WBCS — SIGNIFICANT CHANGE UP (ref 0–0)
PLATELET # BLD AUTO: 316 K/UL — SIGNIFICANT CHANGE UP (ref 150–400)
RBC # BLD: 4.72 M/UL — SIGNIFICANT CHANGE UP (ref 3.8–5.2)
RBC # FLD: 14.1 % — SIGNIFICANT CHANGE UP (ref 10.3–14.5)
WBC # BLD: 5.59 K/UL — SIGNIFICANT CHANGE UP (ref 3.8–10.5)
WBC # FLD AUTO: 5.59 K/UL — SIGNIFICANT CHANGE UP (ref 3.8–10.5)

## 2022-09-19 PROCEDURE — 99214 OFFICE O/P EST MOD 30 MIN: CPT

## 2022-09-19 NOTE — HISTORY OF PRESENT ILLNESS
[Disease: _____________________] : Disease: [unfilled] [T: ___] : T[unfilled] [N: ___] : N[unfilled] [AJCC Stage: ____] : AJCC Stage: [unfilled] [de-identified] : 2/4/2022–Screening mammogram revealed a focal distortion 6:00 right breast, focal mass posterior right upper outer quadrant and focal asymmetry left breast.\par \par 5/11/2022–DIagnostic mammogram and breast ultrasound revealed suspicious irregular hypoechoic mass at 6:00 right breast, corresponding with the mammographic distortion.\par \par 3/11/2022–RIght breast ultrasound-guided core biopsy, 6:00–invasive well-differentiated ductal carcinoma measuring at least 0.7 cm.  DCIS, cribriform pattern with intermediate nuclear grade.  No LVI.  ER positive (greater than 75%, moderate)/MI positive (greater than 90%, strong)/HER2 equivocal (2+) with follow-up CISH testing negative (non-amplified).\par \par 3/25/2022–Breast MRI–spiculated enhancing mass right breast corresponding to the known previously biopsied carcinoma.  Scattered progressively enhancing foci without suspicious region.  Negative for right axillary adenopathy.  Left breast showed a small spiculated enhancing mass in the lower outer quadrant measuring 12 mm for which biopsy recommended.\par \par 4/8/2022–Left breast MRI guided biopsy–usual ductal hyperplasia, fibroadenomatoid changes and stromal fibrosis.\par \par 5/17/2022–Status post right breast lumpectomy with pathology revealing an invasive moderately differentiated ductal carcinoma with focal micropapillary features, 15 mm.  DCIS.  Margins negative.  1 sentinel lymph node positive with macrometastasis, largest metastatic deposit 3 mm.\par \par 6/27/2022-Began Lupron.\par \par 9/15/2022-Began radiation. [de-identified] : Invasive ductal carcinoma [de-identified] : ER+/KS+/Her2- [de-identified] : Oncotype Dx. recurrence score of 8, suggesting no apparent benefit for group average absolute chemotherapy benefit. [de-identified] : Right shoulder surgery being deferred, per ortho. Patient to get PT.\par Radiation-per Dr. Regalado-began 9/15/22.\par No current pulmonary/GI//neurologic complaints.  No fevers.  No bleeding.\par \par Did not have COVID vaccines.\par

## 2022-09-19 NOTE — ASSESSMENT
[FreeTextEntry1] : Lab work reviewed.\par Stage IIA ([id number]) right breast cancer, status post right breast conservation surgery/axillary lymph node dissection.  ER positive/ME positive/HER2 CISH negative. Oncotype Dx. recurrence score of 8.\par Considering her premenopausal status, had discussed with patient that there may be an absolute benefit of chemotherapy with regard to improving 5 year disease free survival with an Oncotype Dx. recurrence score of 8 of up to ~ 5%, and OS survival improvement of up to ~ 2 %.  As patient is greater than 45 years old, it is unclear if the benefit of chemotherapy may be due to chemotherapy-induced premature menopause.  Ovarian suppression along with an aromatase inhibitor is a reasonable alternative per NCCN guidelines. \par Patient opted to proceed with RT/endocrine therapy( and wished to hold off on chemotherapy).\par 6/27/2022-Began Lupron. Plan for adding AI therapy post radiation.\par BDT 8/2022-WNL.\par Radiation per Dr. tinajero.\par \par Patient was given the opportunity to ask questions.  Her questions have been answered to her apparent satisfaction at this time.  She expressed her understanding and willingness to comply with recommended follow-up.\par \par

## 2022-09-19 NOTE — PHYSICAL EXAM
[Normal] : affect appropriate [de-identified] : no dominant mass, nipple retraction or discharge. [de-identified] : decreased ROM right shoulder

## 2022-09-20 LAB
ALBUMIN SERPL ELPH-MCNC: 4.4 G/DL
ALP BLD-CCNC: 41 U/L
ALT SERPL-CCNC: 19 U/L
ANION GAP SERPL CALC-SCNC: 10 MMOL/L
AST SERPL-CCNC: 18 U/L
BILIRUB SERPL-MCNC: 0.6 MG/DL
BUN SERPL-MCNC: 12 MG/DL
CALCIUM SERPL-MCNC: 9.9 MG/DL
CHLORIDE SERPL-SCNC: 108 MMOL/L
CO2 SERPL-SCNC: 27 MMOL/L
CREAT SERPL-MCNC: 0.68 MG/DL
EGFR: 105 ML/MIN/1.73M2
GLUCOSE SERPL-MCNC: 93 MG/DL
POTASSIUM SERPL-SCNC: 4.4 MMOL/L
PROT SERPL-MCNC: 7.6 G/DL
SODIUM SERPL-SCNC: 145 MMOL/L

## 2022-09-21 ENCOUNTER — NON-APPOINTMENT (OUTPATIENT)
Age: 52
End: 2022-09-21

## 2022-09-21 PROCEDURE — 77427 RADIATION TX MANAGEMENT X5: CPT

## 2022-09-28 ENCOUNTER — NON-APPOINTMENT (OUTPATIENT)
Age: 52
End: 2022-09-28

## 2022-09-28 DIAGNOSIS — Z92.3 PERSONAL HISTORY OF IRRADIATION: ICD-10-CM

## 2022-09-28 PROCEDURE — 77427 RADIATION TX MANAGEMENT X5: CPT

## 2022-09-28 NOTE — PHYSICAL EXAM
[Normal] : well developed, well nourished, in no acute distress [de-identified] : Right lumpectomy and axillary scars are well healed, no erythema, no hyperpigmentation.

## 2022-09-28 NOTE — DISEASE MANAGEMENT
[Pathological] : TNM Stage: p [IB] : IB [TTNM] : 1c [NTNM] : 1a [MTNM] : 0 [de-identified] : 1325 cGy [de-identified] : 5240 cGy [de-identified] : Right breast

## 2022-10-04 NOTE — PHYSICAL EXAM
[Normal] : well developed, well nourished, in no acute distress [de-identified] : Right lumpectomy and axillary scars are well healed, no erythema, no hyperpigmentation.

## 2022-10-04 NOTE — REVIEW OF SYSTEMS
[Alopecia: Grade 0] : Alopecia: Grade 0 [Pruritus: Grade 0] : Pruritus: Grade 0 [Skin Atrophy: Grade 0] : Skin Atrophy: Grade 0 [Skin Induration: Grade 0] : Skin Induration: Grade 0 [Fatigue: Grade 0] : Fatigue: Grade 0 [Breast Pain: Grade 0] : Breast Pain: Grade 0 [Skin Hyperpigmentation: Grade 1 - Hyperpigmentation covering <10% BSA; no psychosocial impact] : Skin Hyperpigmentation: Grade 1 - Hyperpigmentation covering <10% BSA; no psychosocial impact [Dermatitis Radiation: Grade 1 - Faint erythema or dry desquamation] : Dermatitis Radiation: Grade 1 - Faint erythema or dry desquamation [FreeTextEntry4] : using Aquaphor 1-2 times daily

## 2022-10-04 NOTE — DISEASE MANAGEMENT
[Pathological] : TNM Stage: p [IB] : IB [TTNM] : 1c [NTNM] : 1a [MTNM] : 0 [de-identified] : 2650 cGy [de-identified] : 5240 cGy [de-identified] : Right breast/Regional Nodes plus Boost

## 2022-10-04 NOTE — HISTORY OF PRESENT ILLNESS
[FreeTextEntry1] : Ms. Pavon is a 52 year old premenopausal female with stage IB, [id number] moderately differentiated invasive ductal carcinoma of the right breast, ER/MD positive and HER2 negative. She underwent lumpectomy with negative margins and had a positive sentinel lymph nodes biopsy. Oncotype Dx score was 8. She is a suitable candidate for breast conservation, which includes radiation therapy to the breast after lumpectomy. \par \par 9/28/22 - OTV 10/20fx completed.  She presents today for a visit on treatment.  She denies breast pain or fatigue. SKin with some mild hyperpigmentation, she is applying Aquaphor to the treatment area.  Ms. Pavon complains of right shoulder pain which has been aggravated by the position she has to be in for treatment, taking Ibuprofen with minimal relief.

## 2022-10-05 ENCOUNTER — NON-APPOINTMENT (OUTPATIENT)
Age: 52
End: 2022-10-05

## 2022-10-05 PROCEDURE — 77427 RADIATION TX MANAGEMENT X5: CPT

## 2022-10-05 PROCEDURE — 77280 THER RAD SIMULAJ FIELD SMPL: CPT | Mod: 26

## 2022-10-09 NOTE — DISEASE MANAGEMENT
[Pathological] : TNM Stage: p [IB] : IB [TTNM] : 1c [MTNM] : 0 [NTNM] : 1a [de-identified] : 3975 cGy [de-identified] : 5240 cGy [de-identified] : Right breast/Regional Nodes plus Boost

## 2022-10-09 NOTE — REVIEW OF SYSTEMS
[Alopecia: Grade 0] : Alopecia: Grade 0 [Pruritus: Grade 0] : Pruritus: Grade 0 [Skin Atrophy: Grade 0] : Skin Atrophy: Grade 0 [Skin Hyperpigmentation: Grade 1 - Hyperpigmentation covering <10% BSA; no psychosocial impact] : Skin Hyperpigmentation: Grade 1 - Hyperpigmentation covering <10% BSA; no psychosocial impact [Skin Induration: Grade 0] : Skin Induration: Grade 0 [Dermatitis Radiation: Grade 1 - Faint erythema or dry desquamation] : Dermatitis Radiation: Grade 1 - Faint erythema or dry desquamation [FreeTextEntry4] : using Aquaphor 1-2 times daily

## 2022-10-09 NOTE — HISTORY OF PRESENT ILLNESS
[FreeTextEntry1] : Ms. Pavon is a 52 year old premenopausal female with stage IB, [id number] moderately differentiated invasive ductal carcinoma of the right breast, ER/AK positive and HER2 negative. She underwent lumpectomy with negative margins and had a positive sentinel lymph nodes biopsy. Oncotype Dx score was 8. She is receiving adjuvant radiation therapy. \par \par  She presents today for a visit on treatment.  She reports intermittent twinges pain right breast. Skin with some mild hyperpigmentation, she is applying Aquaphor to the treatment area.  Ms. Pavon complains of right shoulder pain which has been aggravated by the position she has to be in for treatment, taking Ibuprofen with minimal relief. She was not able to get the tramadol which requires pre-approval.

## 2022-10-09 NOTE — PHYSICAL EXAM
[Normal] : well developed, well nourished, in no acute distress [de-identified] : Right lumpectomy and axillary scars are well healed, mild erythema and hyperpigmentation.

## 2022-10-11 ENCOUNTER — OUTPATIENT (OUTPATIENT)
Dept: OUTPATIENT SERVICES | Facility: HOSPITAL | Age: 52
LOS: 1 days | Discharge: HOME | End: 2022-10-11

## 2022-10-11 DIAGNOSIS — Z12.39 ENCOUNTER FOR OTHER SCREENING FOR MALIGNANT NEOPLASM OF BREAST: ICD-10-CM

## 2022-10-11 DIAGNOSIS — Z98.890 OTHER SPECIFIED POSTPROCEDURAL STATES: Chronic | ICD-10-CM

## 2022-10-12 ENCOUNTER — NON-APPOINTMENT (OUTPATIENT)
Age: 52
End: 2022-10-12

## 2022-10-12 PROCEDURE — 77427 RADIATION TX MANAGEMENT X5: CPT

## 2022-10-12 NOTE — DISEASE MANAGEMENT
[Pathological] : TNM Stage: p [IB] : IB [TTNM] : 1c [NTNM] : 1a [MTNM] : 0 [de-identified] : 5240 cGy [de-identified] : 5240 cGy [de-identified] : Right breast/Regional Nodes plus Boost

## 2022-10-12 NOTE — HISTORY OF PRESENT ILLNESS
[FreeTextEntry1] : Ms. Pavon is a 52 year old premenopausal female with stage IB, [id number] moderately differentiated invasive ductal carcinoma of the right breast, ER/TN positive and HER2 negative. She underwent lumpectomy with negative margins and had a positive sentinel lymph nodes biopsy. Oncotype Dx score was 8. She is receiving adjuvant radiation therapy. \par \par She reports intermittent twinges pain right breast. Skin with some mild hyperpigmentation, she is applying Aquaphor to the treatment area.  She continues to have right shoulder pain which has been aggravated by the position she has to be in for treatment, taking Ibuprofen with some improvement. Prescribed tramadol, but insurance needed authorization which was initiated. The status of Rx will be checked.

## 2022-10-12 NOTE — VITALS
[Maximal Pain Intensity: 4/10] : 4/10 [Least Pain Intensity: 0/10] : 0/10 [Pain Description/Quality: ___] : Pain description/quality: [unfilled] [Pain Duration: ___] : Pain duration: [unfilled] [Pain Location: ___] : Pain Location: [unfilled] [80: Normal activity with effort; some signs or symptoms of disease.] : 80: Normal activity with effort; some signs or symptoms of disease.  [Pain Interferes with ADLs] : Pain does not interfere with activities of daily living

## 2022-10-12 NOTE — PHYSICAL EXAM
[Normal] : well developed, well nourished, in no acute distress [de-identified] : Right lumpectomy and axillary scars are well healed, mild erythema and moderate hyperpigmentation.

## 2022-10-17 ENCOUNTER — APPOINTMENT (OUTPATIENT)
Dept: HEMATOLOGY ONCOLOGY | Facility: CLINIC | Age: 52
End: 2022-10-17

## 2022-10-17 ENCOUNTER — APPOINTMENT (OUTPATIENT)
Dept: INFUSION THERAPY | Facility: HOSPITAL | Age: 52
End: 2022-10-17

## 2022-10-17 VITALS
TEMPERATURE: 97.3 F | HEART RATE: 94 BPM | WEIGHT: 124.12 LBS | DIASTOLIC BLOOD PRESSURE: 87 MMHG | BODY MASS INDEX: 26.89 KG/M2 | SYSTOLIC BLOOD PRESSURE: 133 MMHG | OXYGEN SATURATION: 96 % | RESPIRATION RATE: 16 BRPM

## 2022-10-17 PROCEDURE — 99214 OFFICE O/P EST MOD 30 MIN: CPT

## 2022-10-17 NOTE — ASSESSMENT
[FreeTextEntry1] : CBC, CMP, Dr. Regalado's 10/12/22 radiation oncology note reviewed.\par Stage IIA ([id number]) right breast cancer, status post right breast conservation surgery/axillary lymph node dissection.  ER positive/MA positive/HER2 CISH negative. Oncotype Dx. recurrence score of 8.\par Considering her premenopausal status, had discussed with patient that there may be an absolute benefit of chemotherapy with regard to improving 5 year disease free survival with an Oncotype Dx. recurrence score of 8 of up to ~ 5%, and OS survival improvement of up to ~ 2 %.  As patient is greater than 45 years old, it is unclear if the benefit of chemotherapy may be due to chemotherapy-induced premature menopause.  Ovarian suppression along with an aromatase inhibitor is a reasonable alternative per NCCN guidelines. \par Patient opted to proceed with RT/endocrine therapy( and wished to hold off on chemotherapy).\par 6/27/2022-Began Lupron. \par 10/12/2022-Completed radiation (Dr. Regalado).\par Reviewed with patient endocrine therapy alternatives with respective pros/cons.  Patient is agreeable to begin exemestane-potential side effects explained including but not limited to arthralgias, osteoporosis, vascular toxicity.\par BDT 8/2022-WNL.\par \par \par Patient was given the opportunity to ask questions.  Her questions have been answered to her apparent satisfaction at this time.  She expressed her understanding and willingness to comply with recommended follow-up.\par \par

## 2022-10-17 NOTE — PHYSICAL EXAM
[Normal] : affect appropriate [de-identified] : no dominant mass, nipple retraction or discharge. [de-identified] : decreased ROM right shoulder

## 2022-10-17 NOTE — HISTORY OF PRESENT ILLNESS
[Disease: _____________________] : Disease: [unfilled] [T: ___] : T[unfilled] [N: ___] : N[unfilled] [AJCC Stage: ____] : AJCC Stage: [unfilled] [de-identified] : 2/4/2022–Screening mammogram revealed a focal distortion 6:00 right breast, focal mass posterior right upper outer quadrant and focal asymmetry left breast.\par \par 5/11/2022–DIagnostic mammogram and breast ultrasound revealed suspicious irregular hypoechoic mass at 6:00 right breast, corresponding with the mammographic distortion.\par \par 3/11/2022–RIght breast ultrasound-guided core biopsy, 6:00–invasive well-differentiated ductal carcinoma measuring at least 0.7 cm.  DCIS, cribriform pattern with intermediate nuclear grade.  No LVI.  ER positive (greater than 75%, moderate)/GA positive (greater than 90%, strong)/HER2 equivocal (2+) with follow-up CISH testing negative (non-amplified).\par \par 3/25/2022–Breast MRI–spiculated enhancing mass right breast corresponding to the known previously biopsied carcinoma.  Scattered progressively enhancing foci without suspicious region.  Negative for right axillary adenopathy.  Left breast showed a small spiculated enhancing mass in the lower outer quadrant measuring 12 mm for which biopsy recommended.\par \par 4/8/2022–Left breast MRI guided biopsy–usual ductal hyperplasia, fibroadenomatoid changes and stromal fibrosis.\par \par 5/17/2022–Status post right breast lumpectomy with pathology revealing an invasive moderately differentiated ductal carcinoma with focal micropapillary features, 15 mm.  DCIS.  Margins negative.  1 sentinel lymph node positive with macrometastasis, largest metastatic deposit 3 mm.\par \par 6/27/2022-Began Lupron.\par \par 9/15/2022-10/12/2022-Completed radiation-5240 cGy. Treatment Site: Right breast/Regional Nodes plus Boost. \par \par 10/2022-Beginning Exemestane.\par  [de-identified] : Invasive ductal carcinoma [de-identified] : ER+/RI+/Her2- [de-identified] : Oncotype Dx. recurrence score of 8, suggesting no apparent benefit for group average absolute chemotherapy benefit. [de-identified] : Right shoulder surgery being deferred, per ortho. \par Radiation completed 10/12/2022.\par No current pulmonary/GI//neurologic complaints.  No fevers.  No bleeding.\par \par Did not have COVID vaccines.\par

## 2022-10-17 NOTE — CONSULT LETTER
[Dear  ___] : Dear  [unfilled], [Courtesy Letter:] : I had the pleasure of seeing your patient, [unfilled], in my office today. [Please see my note below.] : Please see my note below. [Consult Closing:] : Thank you very much for allowing me to participate in the care of this patient.  If you have any questions, please do not hesitate to contact me. [Sincerely,] : Sincerely, [FreeTextEntry3] : Shaniqua Elliott MD

## 2022-10-18 DIAGNOSIS — C50.919 MALIGNANT NEOPLASM OF UNSPECIFIED SITE OF UNSPECIFIED FEMALE BREAST: ICD-10-CM

## 2022-10-19 ENCOUNTER — NON-APPOINTMENT (OUTPATIENT)
Age: 52
End: 2022-10-19

## 2022-11-03 ENCOUNTER — NON-APPOINTMENT (OUTPATIENT)
Age: 52
End: 2022-11-03

## 2022-11-21 ENCOUNTER — APPOINTMENT (OUTPATIENT)
Dept: HEMATOLOGY ONCOLOGY | Facility: CLINIC | Age: 52
End: 2022-11-21
Payer: MEDICAID

## 2022-11-21 ENCOUNTER — APPOINTMENT (OUTPATIENT)
Dept: INFUSION THERAPY | Facility: HOSPITAL | Age: 52
End: 2022-11-21

## 2022-11-22 ENCOUNTER — APPOINTMENT (OUTPATIENT)
Dept: INFUSION THERAPY | Facility: HOSPITAL | Age: 52
End: 2022-11-22

## 2022-11-22 ENCOUNTER — APPOINTMENT (OUTPATIENT)
Dept: RADIATION ONCOLOGY | Facility: CLINIC | Age: 52
End: 2022-11-22

## 2022-11-22 ENCOUNTER — NON-APPOINTMENT (OUTPATIENT)
Age: 52
End: 2022-11-22

## 2022-11-22 NOTE — PHYSICAL EXAM
[General Appearance - Well Developed] : well developed [No UE Edema] : there is no upper extremity edema [] : no rash [Normal] : oriented to person, place and time, the affect was normal, the mood was normal and not anxious [de-identified] : Right breast lumpectomy sites intact. Mild residual hyperpigmentation. Mild dryness right nipple.

## 2022-11-22 NOTE — HISTORY OF PRESENT ILLNESS
[FreeTextEntry1] : Ms. Pavon is a 52 year old premenopausal female with stage IB, [id number] moderately differentiated invasive ductal carcinoma of the right breast, ER/CO positive and HER2 negative. She underwent lumpectomy with negative margins and had a positive sentinel lymph nodes biopsy. Oncotype Dx score was 8. She completed a course of adjuvant radiation therapy to the right breast, a dose of 4,240 cGy from 9/15/22 - 10/6/22.\par \par She comes today for a post-treatment evaluation. The patient reports feeling generally well.  She is involved in her usual activities.  She has a good appetite and denies dysphagia. She denies cough or shortness of breath.  There has been some improvement in the skin since completing radiation therapy.  She is taking exemestane per Dr. Elliott, tolerating well. She is due for Lupron injection today.\par She will be undergoing right shoulder surgery, and preferable wants to have done before her breast imaging is due in 2/2022.\par \par

## 2022-12-14 ENCOUNTER — OUTPATIENT (OUTPATIENT)
Dept: OUTPATIENT SERVICES | Facility: HOSPITAL | Age: 52
LOS: 1 days | Discharge: HOME | End: 2022-12-14

## 2022-12-14 DIAGNOSIS — Z98.890 OTHER SPECIFIED POSTPROCEDURAL STATES: Chronic | ICD-10-CM

## 2022-12-14 DIAGNOSIS — Z12.39 ENCOUNTER FOR OTHER SCREENING FOR MALIGNANT NEOPLASM OF BREAST: ICD-10-CM

## 2022-12-20 ENCOUNTER — APPOINTMENT (OUTPATIENT)
Dept: INFUSION THERAPY | Facility: HOSPITAL | Age: 52
End: 2022-12-20

## 2023-01-18 ENCOUNTER — APPOINTMENT (OUTPATIENT)
Dept: INFUSION THERAPY | Facility: HOSPITAL | Age: 53
End: 2023-01-18

## 2023-01-18 ENCOUNTER — RESULT REVIEW (OUTPATIENT)
Age: 53
End: 2023-01-18

## 2023-01-18 ENCOUNTER — APPOINTMENT (OUTPATIENT)
Dept: HEMATOLOGY ONCOLOGY | Facility: CLINIC | Age: 53
End: 2023-01-18
Payer: MEDICAID

## 2023-01-18 VITALS
DIASTOLIC BLOOD PRESSURE: 95 MMHG | HEIGHT: 56.97 IN | OXYGEN SATURATION: 98 % | WEIGHT: 127.43 LBS | TEMPERATURE: 98.2 F | SYSTOLIC BLOOD PRESSURE: 150 MMHG | BODY MASS INDEX: 27.49 KG/M2 | HEART RATE: 100 BPM | RESPIRATION RATE: 18 BRPM

## 2023-01-18 PROCEDURE — 99214 OFFICE O/P EST MOD 30 MIN: CPT

## 2023-01-19 DIAGNOSIS — C50.919 MALIGNANT NEOPLASM OF UNSPECIFIED SITE OF UNSPECIFIED FEMALE BREAST: ICD-10-CM

## 2023-01-19 LAB
ALBUMIN SERPL ELPH-MCNC: 4.2 G/DL
ALP BLD-CCNC: 48 U/L
ALT SERPL-CCNC: 25 U/L
ANION GAP SERPL CALC-SCNC: 11 MMOL/L
AST SERPL-CCNC: 22 U/L
BILIRUB SERPL-MCNC: 0.5 MG/DL
BUN SERPL-MCNC: 14 MG/DL
CALCIUM SERPL-MCNC: 10 MG/DL
CHLORIDE SERPL-SCNC: 104 MMOL/L
CO2 SERPL-SCNC: 28 MMOL/L
CREAT SERPL-MCNC: 0.77 MG/DL
EGFR: 93 ML/MIN/1.73M2
GLUCOSE SERPL-MCNC: 124 MG/DL
POTASSIUM SERPL-SCNC: 4.5 MMOL/L
PROT SERPL-MCNC: 7.6 G/DL
SODIUM SERPL-SCNC: 143 MMOL/L

## 2023-01-20 NOTE — ASSESSMENT
[FreeTextEntry1] : CBC, CMP, 11/22/22 radiation oncology note reviewed.\par Stage IIA ([id number]) right breast cancer, status post right breast conservation surgery/axillary lymph node dissection.  ER positive/NM positive/HER2 CISH negative. Oncotype Dx. recurrence score of 8.\par Considering her premenopausal status, had discussed with patient that there may be an absolute benefit of chemotherapy with regard to improving 5 year disease free survival with an Oncotype Dx. recurrence score of 8 of up to ~ 5%, and OS survival improvement of up to ~ 2 %.  As patient is greater than 45 years old, it is unclear if the benefit of chemotherapy may be due to chemotherapy-induced premature menopause.  Ovarian suppression along with an aromatase inhibitor is a reasonable alternative per NCCN guidelines. \par Patient opted to proceed with RT/endocrine therapy( and wished to hold off on chemotherapy).\par 6/27/2022-Began Lupron. \par 10/12/2022-Completed radiation (Dr. Regalado).\par 10/2022-Began exemestane-appears to be tolerating thus far.\par BDT 8/2022-WNL.\par \par \par Patient was given the opportunity to ask questions.  Her questions have been answered to her apparent satisfaction at this time.  She expressed her understanding and willingness to comply with recommended follow-up.\par \par

## 2023-01-20 NOTE — CONSULT LETTER
[Dear  ___] : Dear  [unfilled], [Courtesy Letter:] : I had the pleasure of seeing your patient, [unfilled], in my office today. [Please see my note below.] : Please see my note below. [Consult Closing:] : Thank you very much for allowing me to participate in the care of this patient.  If you have any questions, please do not hesitate to contact me. [Sincerely,] : Sincerely, [DrVashti  ___] : Dr. ROSENBAUM [FreeTextEntry3] : Shaniqua Elliott MD

## 2023-01-20 NOTE — HISTORY OF PRESENT ILLNESS
[Disease: _____________________] : Disease: [unfilled] [T: ___] : T[unfilled] [N: ___] : N[unfilled] [AJCC Stage: ____] : AJCC Stage: [unfilled] [de-identified] : 2/4/2022–Screening mammogram revealed a focal distortion 6:00 right breast, focal mass posterior right upper outer quadrant and focal asymmetry left breast.\par \par 5/11/2022–DIagnostic mammogram and breast ultrasound revealed suspicious irregular hypoechoic mass at 6:00 right breast, corresponding with the mammographic distortion.\par \par 3/11/2022–RIght breast ultrasound-guided core biopsy, 6:00–invasive well-differentiated ductal carcinoma measuring at least 0.7 cm.  DCIS, cribriform pattern with intermediate nuclear grade.  No LVI.  ER positive (greater than 75%, moderate)/OR positive (greater than 90%, strong)/HER2 equivocal (2+) with follow-up CISH testing negative (non-amplified).\par \par 3/25/2022–Breast MRI–spiculated enhancing mass right breast corresponding to the known previously biopsied carcinoma.  Scattered progressively enhancing foci without suspicious region.  Negative for right axillary adenopathy.  Left breast showed a small spiculated enhancing mass in the lower outer quadrant measuring 12 mm for which biopsy recommended.\par \par 4/8/2022–Left breast MRI guided biopsy–usual ductal hyperplasia, fibroadenomatoid changes and stromal fibrosis.\par \par 5/17/2022–Status post right breast lumpectomy with pathology revealing an invasive moderately differentiated ductal carcinoma with focal micropapillary features, 15 mm.  DCIS.  Margins negative.  1 sentinel lymph node positive with macrometastasis, largest metastatic deposit 3 mm.\par \par 6/27/2022-Began Lupron.\par \par 9/15/2022-10/12/2022-Completed radiation-5240 cGy. Treatment Site: Right breast/Regional Nodes plus Boost. \par \par 10/2022-Began Exemestane.\par  [de-identified] : Invasive ductal carcinoma [de-identified] : ER+/SC+/Her2- [de-identified] : Oncotype Dx. recurrence score of 8, suggesting no apparent benefit for group average absolute chemotherapy benefit. [de-identified] : Right shoulder surgery is now being reconsidered by ortho. \par No current pulmonary/GI//neurologic complaints.  No fevers.  No bleeding.\par \par Did not have COVID vaccines.\par

## 2023-01-27 ENCOUNTER — NON-APPOINTMENT (OUTPATIENT)
Age: 53
End: 2023-01-27

## 2023-03-07 ENCOUNTER — APPOINTMENT (OUTPATIENT)
Dept: SURGICAL ONCOLOGY | Facility: CLINIC | Age: 53
End: 2023-03-07

## 2023-04-07 ENCOUNTER — NON-APPOINTMENT (OUTPATIENT)
Age: 53
End: 2023-04-07

## 2023-04-08 ENCOUNTER — OUTPATIENT (OUTPATIENT)
Dept: OUTPATIENT SERVICES | Facility: HOSPITAL | Age: 53
LOS: 1 days | Discharge: HOME | End: 2023-04-08

## 2023-04-08 DIAGNOSIS — Z12.39 ENCOUNTER FOR OTHER SCREENING FOR MALIGNANT NEOPLASM OF BREAST: ICD-10-CM

## 2023-04-08 DIAGNOSIS — Z98.890 OTHER SPECIFIED POSTPROCEDURAL STATES: Chronic | ICD-10-CM

## 2023-04-12 ENCOUNTER — APPOINTMENT (OUTPATIENT)
Dept: HEMATOLOGY ONCOLOGY | Facility: CLINIC | Age: 53
End: 2023-04-12
Payer: MEDICAID

## 2023-04-12 ENCOUNTER — RESULT REVIEW (OUTPATIENT)
Age: 53
End: 2023-04-12

## 2023-04-12 ENCOUNTER — APPOINTMENT (OUTPATIENT)
Dept: INFUSION THERAPY | Facility: HOSPITAL | Age: 53
End: 2023-04-12

## 2023-04-12 VITALS
BODY MASS INDEX: 28.44 KG/M2 | TEMPERATURE: 98.2 F | OXYGEN SATURATION: 99 % | RESPIRATION RATE: 17 BRPM | WEIGHT: 131.28 LBS | HEART RATE: 93 BPM | DIASTOLIC BLOOD PRESSURE: 101 MMHG | SYSTOLIC BLOOD PRESSURE: 168 MMHG

## 2023-04-12 VITALS — DIASTOLIC BLOOD PRESSURE: 96 MMHG | SYSTOLIC BLOOD PRESSURE: 160 MMHG

## 2023-04-12 LAB
BASOPHILS # BLD AUTO: 0.01 K/UL — SIGNIFICANT CHANGE UP (ref 0–0.2)
BASOPHILS NFR BLD AUTO: 0.2 % — SIGNIFICANT CHANGE UP (ref 0–2)
EOSINOPHIL # BLD AUTO: 0.1 K/UL — SIGNIFICANT CHANGE UP (ref 0–0.5)
EOSINOPHIL NFR BLD AUTO: 1.6 % — SIGNIFICANT CHANGE UP (ref 0–6)
HCT VFR BLD CALC: 38 % — SIGNIFICANT CHANGE UP (ref 34.5–45)
HGB BLD-MCNC: 12.5 G/DL — SIGNIFICANT CHANGE UP (ref 11.5–15.5)
IMM GRANULOCYTES NFR BLD AUTO: 0.5 % — SIGNIFICANT CHANGE UP (ref 0–0.9)
LYMPHOCYTES # BLD AUTO: 1.51 K/UL — SIGNIFICANT CHANGE UP (ref 1–3.3)
LYMPHOCYTES # BLD AUTO: 24.4 % — SIGNIFICANT CHANGE UP (ref 13–44)
MCHC RBC-ENTMCNC: 27.5 PG — SIGNIFICANT CHANGE UP (ref 27–34)
MCHC RBC-ENTMCNC: 32.9 G/DL — SIGNIFICANT CHANGE UP (ref 32–36)
MCV RBC AUTO: 83.5 FL — SIGNIFICANT CHANGE UP (ref 80–100)
MONOCYTES # BLD AUTO: 0.49 K/UL — SIGNIFICANT CHANGE UP (ref 0–0.9)
MONOCYTES NFR BLD AUTO: 7.9 % — SIGNIFICANT CHANGE UP (ref 2–14)
NEUTROPHILS # BLD AUTO: 4.04 K/UL — SIGNIFICANT CHANGE UP (ref 1.8–7.4)
NEUTROPHILS NFR BLD AUTO: 65.4 % — SIGNIFICANT CHANGE UP (ref 43–77)
NRBC # BLD: 0 /100 WBCS — SIGNIFICANT CHANGE UP (ref 0–0)
PLATELET # BLD AUTO: 255 K/UL — SIGNIFICANT CHANGE UP (ref 150–400)
RBC # BLD: 4.55 M/UL — SIGNIFICANT CHANGE UP (ref 3.8–5.2)
RBC # FLD: 14.2 % — SIGNIFICANT CHANGE UP (ref 10.3–14.5)
WBC # BLD: 6.18 K/UL — SIGNIFICANT CHANGE UP (ref 3.8–10.5)
WBC # FLD AUTO: 6.18 K/UL — SIGNIFICANT CHANGE UP (ref 3.8–10.5)

## 2023-04-12 PROCEDURE — 99214 OFFICE O/P EST MOD 30 MIN: CPT

## 2023-04-12 NOTE — HISTORY OF PRESENT ILLNESS
[Disease: _____________________] : Disease: [unfilled] [T: ___] : T[unfilled] [N: ___] : N[unfilled] [AJCC Stage: ____] : AJCC Stage: [unfilled] [de-identified] : 2/4/2022–Screening mammogram revealed a focal distortion 6:00 right breast, focal mass posterior right upper outer quadrant and focal asymmetry left breast.\par \par 5/11/2022–DIagnostic mammogram and breast ultrasound revealed suspicious irregular hypoechoic mass at 6:00 right breast, corresponding with the mammographic distortion.\par \par 3/11/2022–RIght breast ultrasound-guided core biopsy, 6:00–invasive well-differentiated ductal carcinoma measuring at least 0.7 cm.  DCIS, cribriform pattern with intermediate nuclear grade.  No LVI.  ER positive (greater than 75%, moderate)/MI positive (greater than 90%, strong)/HER2 equivocal (2+) with follow-up CISH testing negative (non-amplified).\par \par 3/25/2022–Breast MRI–spiculated enhancing mass right breast corresponding to the known previously biopsied carcinoma.  Scattered progressively enhancing foci without suspicious region.  Negative for right axillary adenopathy.  Left breast showed a small spiculated enhancing mass in the lower outer quadrant measuring 12 mm for which biopsy recommended.\par \par 4/8/2022–Left breast MRI guided biopsy–usual ductal hyperplasia, fibroadenomatoid changes and stromal fibrosis.\par \par 5/17/2022–Status post right breast lumpectomy with pathology revealing an invasive moderately differentiated ductal carcinoma with focal micropapillary features, 15 mm.  DCIS.  Margins negative.  1 sentinel lymph node positive with macrometastasis, largest metastatic deposit 3 mm.\par \par 6/27/2022-Began Lupron.\par \par 9/15/2022-10/12/2022-Completed radiation-5240 cGy. Treatment Site: Right breast/Regional Nodes plus Boost. \par \par 10/2022-Began Exemestane.\par  [de-identified] : Invasive ductal carcinoma [de-identified] : ER+/PA+/Her2- [de-identified] : Oncotype Dx. recurrence score of 8, suggesting no apparent benefit for group average absolute chemotherapy benefit. [de-identified] : States BP is high when comes to our office-gets nervous. States BP WNL when she checks it at home.\par Has ortho f/u planned next week to address right shoulder issues.\par No current pulmonary/GI//neurologic complaints.  No fevers.  No bleeding.\par Has mammogram/breast US scheduled 4/15/23.\par \par

## 2023-04-12 NOTE — ASSESSMENT
[FreeTextEntry1] : CBC, CMP, 3/7/23 surgery note reviewed.\par #1) Stage IIA ([id number]) right breast cancer, status post right breast conservation surgery/axillary lymph node dissection.  ER positive/WI positive/HER2 CISH negative. Oncotype Dx. recurrence score of 8.\par Considering her premenopausal status, had discussed with patient that there may be an absolute benefit of chemotherapy with regard to improving 5 year disease free survival with an Oncotype Dx. recurrence score of 8 of up to ~ 5%, and OS survival improvement of up to ~ 2 %.  As patient is greater than 45 years old, it is unclear if the benefit of chemotherapy may be due to chemotherapy-induced premature menopause.  Ovarian suppression along with an aromatase inhibitor is a reasonable alternative per NCCN guidelines. \par Patient opted to proceed with RT/endocrine therapy( and wished to hold off on chemotherapy).\par Clinically [facility].\par 6/27/2022-Began Lupron. \par 10/12/2022-Completed radiation (Dr. Regalado).\par 10/2022-Began exemestane.\par BDT 8/2022-WNL.\par Next breast imaging ordered by surgeon.\par \par #2) Advised to f/u with PCP for BP management/primary care issues.\par \par Patient was given the opportunity to ask questions.  Her questions have been answered to her apparent satisfaction at this time.  She expressed her understanding and willingness to comply with recommended follow-up.\par \par

## 2023-04-13 DIAGNOSIS — C50.919 MALIGNANT NEOPLASM OF UNSPECIFIED SITE OF UNSPECIFIED FEMALE BREAST: ICD-10-CM

## 2023-04-13 LAB
ALBUMIN SERPL ELPH-MCNC: 4.3 G/DL
ALP BLD-CCNC: 45 U/L
ALT SERPL-CCNC: 23 U/L
ANION GAP SERPL CALC-SCNC: 12 MMOL/L
AST SERPL-CCNC: 20 U/L
BILIRUB SERPL-MCNC: 0.6 MG/DL
BUN SERPL-MCNC: 14 MG/DL
CALCIUM SERPL-MCNC: 9.7 MG/DL
CHLORIDE SERPL-SCNC: 104 MMOL/L
CO2 SERPL-SCNC: 24 MMOL/L
CREAT SERPL-MCNC: 0.73 MG/DL
EGFR: 99 ML/MIN/1.73M2
GLUCOSE SERPL-MCNC: 101 MG/DL
POTASSIUM SERPL-SCNC: 3.8 MMOL/L
PROT SERPL-MCNC: 7.5 G/DL
SODIUM SERPL-SCNC: 140 MMOL/L

## 2023-05-15 ENCOUNTER — APPOINTMENT (OUTPATIENT)
Dept: ULTRASOUND IMAGING | Facility: IMAGING CENTER | Age: 53
End: 2023-05-15
Payer: MEDICAID

## 2023-05-15 ENCOUNTER — APPOINTMENT (OUTPATIENT)
Dept: MAMMOGRAPHY | Facility: IMAGING CENTER | Age: 53
End: 2023-05-15
Payer: MEDICAID

## 2023-05-15 ENCOUNTER — RESULT REVIEW (OUTPATIENT)
Age: 53
End: 2023-05-15

## 2023-05-15 ENCOUNTER — OUTPATIENT (OUTPATIENT)
Dept: OUTPATIENT SERVICES | Facility: HOSPITAL | Age: 53
LOS: 1 days | End: 2023-05-15
Payer: MEDICAID

## 2023-05-15 DIAGNOSIS — Z00.00 ENCOUNTER FOR GENERAL ADULT MEDICAL EXAMINATION WITHOUT ABNORMAL FINDINGS: ICD-10-CM

## 2023-05-15 DIAGNOSIS — Z98.890 OTHER SPECIFIED POSTPROCEDURAL STATES: Chronic | ICD-10-CM

## 2023-05-15 PROCEDURE — 76641 ULTRASOUND BREAST COMPLETE: CPT

## 2023-05-15 PROCEDURE — 76641 ULTRASOUND BREAST COMPLETE: CPT | Mod: 26,50

## 2023-05-15 PROCEDURE — G0279: CPT

## 2023-05-15 PROCEDURE — 77066 DX MAMMO INCL CAD BI: CPT | Mod: 26

## 2023-05-15 PROCEDURE — 77062 BREAST TOMOSYNTHESIS BI: CPT | Mod: 26

## 2023-05-15 PROCEDURE — 77066 DX MAMMO INCL CAD BI: CPT

## 2023-05-17 PROBLEM — Z12.39 BREAST CANCER SCREENING, HIGH RISK PATIENT: Status: ACTIVE | Noted: 2023-05-17

## 2023-05-24 ENCOUNTER — APPOINTMENT (OUTPATIENT)
Dept: SURGICAL ONCOLOGY | Facility: CLINIC | Age: 53
End: 2023-05-24
Payer: MEDICAID

## 2023-05-24 VITALS
DIASTOLIC BLOOD PRESSURE: 102 MMHG | HEART RATE: 98 BPM | TEMPERATURE: 97.6 F | HEIGHT: 56 IN | OXYGEN SATURATION: 99 % | RESPIRATION RATE: 17 BRPM | SYSTOLIC BLOOD PRESSURE: 171 MMHG | BODY MASS INDEX: 29.47 KG/M2 | WEIGHT: 131 LBS

## 2023-05-24 VITALS — SYSTOLIC BLOOD PRESSURE: 159 MMHG | DIASTOLIC BLOOD PRESSURE: 98 MMHG

## 2023-05-24 DIAGNOSIS — Z12.39 ENCOUNTER FOR OTHER SCREENING FOR MALIGNANT NEOPLASM OF BREAST: ICD-10-CM

## 2023-05-24 PROCEDURE — 99213 OFFICE O/P EST LOW 20 MIN: CPT

## 2023-05-24 RX ORDER — TRAMADOL HYDROCHLORIDE 50 MG/1
50 TABLET, COATED ORAL
Qty: 60 | Refills: 0 | Status: DISCONTINUED | COMMUNITY
Start: 2022-09-28 | End: 2023-05-24

## 2023-05-24 NOTE — PHYSICAL EXAM
[Normocephalic] : normocephalic [Atraumatic] : atraumatic [EOMI] : extra ocular movement intact [PERRL] : pupils equal, round and reactive to light [Sclera nonicteric] : sclera nonicteric [Bra Size: ___] : Bra Size: [unfilled] [Grade 1] : Ptosis Grade 1 [No dominant masses] : no dominant masses in right breast  [No Nipple Retraction] : no right nipple retraction [No Nipple Discharge] : no right nipple discharge [No Axillary Lymphadenopathy] : no right axillary lymphadenopathy [No Edema] : no edema [No Rashes] : no rashes [No Ulceration] : no ulceration [Supple] : supple [No Supraclavicular Adenopathy] : no supraclavicular adenopathy [No Cervical Adenopathy] : no cervical adenopathy [Examined in the supine and seated position] : examined in the supine and seated position [de-identified] : non-labored respirations  [de-identified] : circumareolar incision well-healed, some post RT skin thickening, hyperpigmentation [de-identified] : well-healed transverse incision

## 2023-05-24 NOTE — ASSESSMENT
[FreeTextEntry1] : The patient is a 52 year old female referred for consultation by Dr. Estiven August for Right breast IDC now s/p Right breast lumpectomy and SLNB 5/2/2022.\par \par 5/2/2022 R breast lumpectomy, R SLNB\par - R SLNB (1/1) positive for carcinoma, 3mm. No extranodel extension.\par - R breast: IDC, moderately differentiated w/ focal micropapillary features, 15mm. DCIS, intermediate nuclear grade, solid, cribriform and micropapillary type, w/ calcs and focal necrosis. Lymphovascular invasion is identified. Sclerosing duct papilloma. Prior biopsy site changes. The invasive carcinoma is very close to, <1 mm, the designated inferior margin of the specimen.\par -R margins: all negative\par -AJCC Staging: [id number]\par -ODX 8\par \par 6/27/2022: Began Lupron\par \par 8/2/2022: Bone density: WNL\par \par 8/30/2022: Exam shows well-healed incisions. No evidence of infection.\par \par 10/2022: Completed RT and started Exemestane. \par \par Recent imaging revealed R breast seroma and L breast cyst, BR2. \par \par Exam today shows well-healed incisions, post RT changes, no masses or lymphadenopathy.\par \par Plan:\par  - F/u med onc, continue exemestane \par  - 11/2023 MRI\par  - 5/2024 B/L SM/US\par  - RTO 6 months

## 2023-05-24 NOTE — HISTORY OF PRESENT ILLNESS
[FreeTextEntry1] : The patient is a 52 year old female referred for consultation by Dr. Estiven August for Right breast IDC now s/p Right breast lumpectomy and SLNB 5/2/2022 here for a follow up visit.\par \par Prior history:\par The patient reports that she does not get regular screening. Her first mammogram was 3 years ago, reportedly normal. Her most recent imaging showed:\par \par 2/03/2022 B/L SM (NW) TC 5.7%, scattered fibroglandular densities \par  - R breast 6:00 focal distortion -> DM and US\par  - R UOQ focal mass -> US  \par  - L far posterior central to slightly outer breast -> DM and US\par  - BR0\par \par 2/11/2022 B/L DM\par  - R central lower breast 6:00 persistent architectural distortion\par  - L slightly outer asymmetry predominantly effaces\par  - L breast asymmetry not seen on ML view\par \par 2/11/2022 B/L US\par  - R 6:00 N5 1.4 x 0.7 x 0.7 cm irregular hypoechoic mass -> BX\par  - R 9:00 N10 6 mm benign-appearing IMLN\par  - R 9:00 N9 8 mm benign-appearing complicated cyst; adjacent 4 mm complicated cyst\par  - L negative\par  - BR4C\par \par 3/11/2022 R USG-CNB\par  - R 6:00 N5 (coil): IDC, G1, DCIS\par  - ER >75% mod, NM >90% strong, HER2 2+/FISH negative (ratio 1.0, copy 1.8)\par \par 3/25/2022 MRI\par  - R spiculated mass posterior and slightly lateral to biopsied carcinoma\par  - L lower outer quadrant 12 mm small spiculated mass -> BX\par  - B/L LN negative\par  - BR6\par \par The patient reports that she has not had the left breast biopsied. She does not feel the mass. Otherwise has no breast complaints.\par \par She denies any medical or surgical history. She takes no medications\par Denies any family history of breast cancer. Has a maternal aunt with lung cancer.\par \par 4/8/2022 L MR bx\par  - L spiculated mass (hourglass clip): UDH, fibroadenomatoid changes, stromal fibrosis, benign concordant\par \par 5/2/2022 R breast lumpectomy, R SLNB\par - R SLNB (1/1) positive for carcinoma, 3mm. No extranodel extension.\par - R breast: IDC, moderately differentiated w/ focal micropapillary features, 15mm. DCIS, intermediate nuclear grade, solid, cribriform and micropapillary type, w/ calcs and focal necrosis. Lymphovascular invasion is identified. Sclerosing duct papilloma. Prior biopsy site changes. The invasive carcinoma is very close to, <1 mm, the designated inferior margin of the specimen.\par -R margins: all negative\par -AJCC Staging: [id number]\par -ODX 8\par \par 5/11/2022:\par The patient reports taking Tylenol immediately after surgery, but does not require anymore. She has some right shoulder pain, but this is longstanding. Is waiting to schedule shoulder surgery.\par \par 6/27/2022: Began Lupron\par \par 8/2/2022: Bone density: WNL\par \par 8/30/2022: pt met with med onc--no plan for chemotherapy. Radiation plan was on hold until followup with orthopedic surgeon regarding frozen shoulder. Most recent note indicates plan for PT now, and pt should proceed with RT. Planned to start RT tomorrow. Still feels some tenderness in the right breast.\par \par 5/15/2023 B/L DM (NW) revealed SFGD breasts\par - R central: postlumpectomy changes\par - L UOQ: bx marker from previous MR BX seen\par \par 5/15/2023 B/L US\par - R 6:00 N4: 15 x 11 x 9 mm seroma at scar site\par - L 2:00 N7: 3 mm cyst\par - BR2, mmg in 1 year\par \par Interval History: Patient completed radiation therapy on 10/2022 and started Exemestane shortly after. Feels some leg cramping, gets better with activity. Recently had very sharp pain on right breast, but has since resolved. Denies any breast complaints today--no lumps, skin change, nipple discharge, no pain.

## 2023-06-28 ENCOUNTER — OUTPATIENT (OUTPATIENT)
Dept: OUTPATIENT SERVICES | Facility: HOSPITAL | Age: 53
LOS: 1 days | Discharge: HOME | End: 2023-06-28

## 2023-06-28 DIAGNOSIS — Z12.39 ENCOUNTER FOR OTHER SCREENING FOR MALIGNANT NEOPLASM OF BREAST: ICD-10-CM

## 2023-06-28 DIAGNOSIS — Z98.890 OTHER SPECIFIED POSTPROCEDURAL STATES: Chronic | ICD-10-CM

## 2023-06-29 ENCOUNTER — NON-APPOINTMENT (OUTPATIENT)
Age: 53
End: 2023-06-29

## 2023-07-07 DIAGNOSIS — Z92.89 PERSONAL HISTORY OF OTHER MEDICAL TREATMENT: ICD-10-CM

## 2023-07-11 ENCOUNTER — APPOINTMENT (OUTPATIENT)
Dept: HEMATOLOGY ONCOLOGY | Facility: CLINIC | Age: 53
End: 2023-07-11
Payer: MEDICAID

## 2023-07-11 ENCOUNTER — RESULT REVIEW (OUTPATIENT)
Age: 53
End: 2023-07-11

## 2023-07-11 ENCOUNTER — APPOINTMENT (OUTPATIENT)
Dept: INFUSION THERAPY | Facility: HOSPITAL | Age: 53
End: 2023-07-11

## 2023-07-11 VITALS
WEIGHT: 128.75 LBS | BODY MASS INDEX: 28.86 KG/M2 | SYSTOLIC BLOOD PRESSURE: 164 MMHG | HEART RATE: 80 BPM | DIASTOLIC BLOOD PRESSURE: 95 MMHG | RESPIRATION RATE: 18 BRPM | OXYGEN SATURATION: 96 % | TEMPERATURE: 97.2 F

## 2023-07-11 LAB
BASOPHILS # BLD AUTO: 0.02 K/UL — SIGNIFICANT CHANGE UP (ref 0–0.2)
BASOPHILS NFR BLD AUTO: 0.3 % — SIGNIFICANT CHANGE UP (ref 0–2)
EOSINOPHIL # BLD AUTO: 0.15 K/UL — SIGNIFICANT CHANGE UP (ref 0–0.5)
EOSINOPHIL NFR BLD AUTO: 2.5 % — SIGNIFICANT CHANGE UP (ref 0–6)
HCT VFR BLD CALC: 38 % — SIGNIFICANT CHANGE UP (ref 34.5–45)
HGB BLD-MCNC: 12.6 G/DL — SIGNIFICANT CHANGE UP (ref 11.5–15.5)
IMM GRANULOCYTES NFR BLD AUTO: 0.2 % — SIGNIFICANT CHANGE UP (ref 0–0.9)
LYMPHOCYTES # BLD AUTO: 1.81 K/UL — SIGNIFICANT CHANGE UP (ref 1–3.3)
LYMPHOCYTES # BLD AUTO: 30.1 % — SIGNIFICANT CHANGE UP (ref 13–44)
MCHC RBC-ENTMCNC: 28.6 PG — SIGNIFICANT CHANGE UP (ref 27–34)
MCHC RBC-ENTMCNC: 33.2 G/DL — SIGNIFICANT CHANGE UP (ref 32–36)
MCV RBC AUTO: 86.2 FL — SIGNIFICANT CHANGE UP (ref 80–100)
MONOCYTES # BLD AUTO: 0.39 K/UL — SIGNIFICANT CHANGE UP (ref 0–0.9)
MONOCYTES NFR BLD AUTO: 6.5 % — SIGNIFICANT CHANGE UP (ref 2–14)
NEUTROPHILS # BLD AUTO: 3.64 K/UL — SIGNIFICANT CHANGE UP (ref 1.8–7.4)
NEUTROPHILS NFR BLD AUTO: 60.4 % — SIGNIFICANT CHANGE UP (ref 43–77)
NRBC # BLD: 0 /100 WBCS — SIGNIFICANT CHANGE UP (ref 0–0)
PLATELET # BLD AUTO: 265 K/UL — SIGNIFICANT CHANGE UP (ref 150–400)
RBC # BLD: 4.41 M/UL — SIGNIFICANT CHANGE UP (ref 3.8–5.2)
RBC # FLD: 13.6 % — SIGNIFICANT CHANGE UP (ref 10.3–14.5)
WBC # BLD: 6.02 K/UL — SIGNIFICANT CHANGE UP (ref 3.8–10.5)
WBC # FLD AUTO: 6.02 K/UL — SIGNIFICANT CHANGE UP (ref 3.8–10.5)

## 2023-07-11 PROCEDURE — 99214 OFFICE O/P EST MOD 30 MIN: CPT

## 2023-07-11 NOTE — ASSESSMENT
[FreeTextEntry1] : CBC, CMP, 5/24/23 surgery note, mammogram/breast US report reviewed.\par #1) 5/2022 Stage IIA ([id number]) right breast cancer, status post right breast conservation surgery/axillary lymph node dissection.  ER positive/IN positive/HER2 CISH negative. Oncotype Dx. recurrence score of 8.\par Considering her premenopausal status, had discussed with patient that there may be an absolute benefit of chemotherapy with regard to improving 5 year disease free survival with an Oncotype Dx. recurrence score of 8 of up to ~ 5%, and OS survival improvement of up to ~ 2 %.  As patient is greater than 45 years old, it is unclear if the benefit of chemotherapy may be due to chemotherapy-induced premature menopause.  Ovarian suppression along with an aromatase inhibitor is a reasonable alternative per NCCN guidelines. \par Patient opted to proceed with RT/endocrine therapy( and wished to hold off on chemotherapy).\par Clinically LION.\par 6/27/2022-Began Lupron. \par 10/12/2022-Completed radiation (Dr. Regalado).\par 10/2022-Began exemestane.\par BDT 8/2022-WNL.\par 5/15/2023-Mammogram/breast US-benign findings.\par \par #2) f/u with PCP for BP management/glucose/primary care issues.\par \par Patient was given the opportunity to ask questions.  Her questions have been answered to her apparent satisfaction at this time.  She expressed her understanding and willingness to comply with recommended follow-up.\par \par

## 2023-07-11 NOTE — PHYSICAL EXAM
[Normal] : affect appropriate [de-identified] : no dominant mass, nipple retraction or discharge. [de-identified] : decreased ROM right shoulder

## 2023-07-11 NOTE — HISTORY OF PRESENT ILLNESS
[Disease: _____________________] : Disease: [unfilled] [T: ___] : T[unfilled] [N: ___] : N[unfilled] [AJCC Stage: ____] : AJCC Stage: [unfilled] [de-identified] : 2/4/2022–Screening mammogram revealed a focal distortion 6:00 right breast, focal mass posterior right upper outer quadrant and focal asymmetry left breast.\par \par 5/11/2022–DIagnostic mammogram and breast ultrasound revealed suspicious irregular hypoechoic mass at 6:00 right breast, corresponding with the mammographic distortion.\par \par 3/11/2022–RIght breast ultrasound-guided core biopsy, 6:00–invasive well-differentiated ductal carcinoma measuring at least 0.7 cm.  DCIS, cribriform pattern with intermediate nuclear grade.  No LVI.  ER positive (greater than 75%, moderate)/AK positive (greater than 90%, strong)/HER2 equivocal (2+) with follow-up CISH testing negative (non-amplified).\par \par 3/25/2022–Breast MRI–spiculated enhancing mass right breast corresponding to the known previously biopsied carcinoma.  Scattered progressively enhancing foci without suspicious region.  Negative for right axillary adenopathy.  Left breast showed a small spiculated enhancing mass in the lower outer quadrant measuring 12 mm for which biopsy recommended.\par \par 4/8/2022–Left breast MRI guided biopsy–usual ductal hyperplasia, fibroadenomatoid changes and stromal fibrosis.\par \par 5/17/2022–Status post right breast lumpectomy with pathology revealing an invasive moderately differentiated ductal carcinoma with focal micropapillary features, 15 mm.  DCIS.  Margins negative.  1 sentinel lymph node positive with macrometastasis, largest metastatic deposit 3 mm.\par \par 6/27/2022-Began Lupron.\par \par 9/15/2022-10/12/2022-Completed radiation-5240 cGy. Treatment Site: Right breast/Regional Nodes plus Boost. \par \par 10/2022-Began Exemestane.\par  [de-identified] : Invasive ductal carcinoma [de-identified] : ER+/MI+/Her2- [de-identified] : Oncotype Dx. recurrence score of 8, suggesting no apparent benefit for group average absolute chemotherapy benefit. [de-identified] : Having right shoulder surgery done 7/17/23.\par Has states BP is high when comes to our office.\par No current pulmonary/GI//neurologic complaints.  No fevers.  No h/o abnormal bleeding.\par \par

## 2023-07-12 ENCOUNTER — NON-APPOINTMENT (OUTPATIENT)
Age: 53
End: 2023-07-12

## 2023-07-12 DIAGNOSIS — C50.919 MALIGNANT NEOPLASM OF UNSPECIFIED SITE OF UNSPECIFIED FEMALE BREAST: ICD-10-CM

## 2023-07-12 LAB
ALBUMIN SERPL ELPH-MCNC: 4.6 G/DL
ALP BLD-CCNC: 49 U/L
ALT SERPL-CCNC: 22 U/L
ANION GAP SERPL CALC-SCNC: 10 MMOL/L
AST SERPL-CCNC: 23 U/L
BILIRUB SERPL-MCNC: 0.7 MG/DL
BUN SERPL-MCNC: 17 MG/DL
CALCIUM SERPL-MCNC: 9.6 MG/DL
CHLORIDE SERPL-SCNC: 108 MMOL/L
CO2 SERPL-SCNC: 27 MMOL/L
CREAT SERPL-MCNC: 0.99 MG/DL
EGFR: 68 ML/MIN/1.73M2
GLUCOSE SERPL-MCNC: 107 MG/DL
INR PPP: 1.09 RATIO
POTASSIUM SERPL-SCNC: 4 MMOL/L
PROT SERPL-MCNC: 7.7 G/DL
PT BLD: 12.7 SEC
SODIUM SERPL-SCNC: 145 MMOL/L

## 2023-09-25 ENCOUNTER — OUTPATIENT (OUTPATIENT)
Dept: OUTPATIENT SERVICES | Facility: HOSPITAL | Age: 53
LOS: 1 days | Discharge: HOME | End: 2023-09-25

## 2023-09-25 DIAGNOSIS — Z12.39 ENCOUNTER FOR OTHER SCREENING FOR MALIGNANT NEOPLASM OF BREAST: ICD-10-CM

## 2023-09-25 DIAGNOSIS — Z98.890 OTHER SPECIFIED POSTPROCEDURAL STATES: Chronic | ICD-10-CM

## 2023-10-01 PROBLEM — Z92.3 HISTORY OF RADIATION THERAPY: Status: RESOLVED | Noted: 2022-09-28 | Resolved: 2023-10-01

## 2023-10-03 ENCOUNTER — APPOINTMENT (OUTPATIENT)
Dept: HEMATOLOGY ONCOLOGY | Facility: CLINIC | Age: 53
End: 2023-10-03

## 2023-10-09 ENCOUNTER — RESULT REVIEW (OUTPATIENT)
Age: 53
End: 2023-10-09

## 2023-10-09 ENCOUNTER — APPOINTMENT (OUTPATIENT)
Dept: INFUSION THERAPY | Facility: HOSPITAL | Age: 53
End: 2023-10-09

## 2023-10-09 ENCOUNTER — APPOINTMENT (OUTPATIENT)
Dept: HEMATOLOGY ONCOLOGY | Facility: CLINIC | Age: 53
End: 2023-10-09
Payer: MEDICAID

## 2023-10-09 VITALS
OXYGEN SATURATION: 98 % | DIASTOLIC BLOOD PRESSURE: 89 MMHG | HEIGHT: 55.98 IN | SYSTOLIC BLOOD PRESSURE: 141 MMHG | WEIGHT: 126.77 LBS | BODY MASS INDEX: 28.52 KG/M2 | RESPIRATION RATE: 16 BRPM | TEMPERATURE: 97.7 F | HEART RATE: 83 BPM

## 2023-10-09 LAB
ALBUMIN SERPL ELPH-MCNC: 4.6 G/DL
ALP BLD-CCNC: 55 U/L
ALT SERPL-CCNC: 26 U/L
ANION GAP SERPL CALC-SCNC: 11 MMOL/L
AST SERPL-CCNC: 21 U/L
BASOPHILS # BLD AUTO: 0.01 K/UL — SIGNIFICANT CHANGE UP (ref 0–0.2)
BASOPHILS NFR BLD AUTO: 0.2 % — SIGNIFICANT CHANGE UP (ref 0–2)
BILIRUB SERPL-MCNC: 0.6 MG/DL
BUN SERPL-MCNC: 11 MG/DL
CALCIUM SERPL-MCNC: 9.5 MG/DL
CHLORIDE SERPL-SCNC: 106 MMOL/L
CO2 SERPL-SCNC: 26 MMOL/L
CREAT SERPL-MCNC: 0.7 MG/DL
EGFR: 103 ML/MIN/1.73M2
EOSINOPHIL # BLD AUTO: 0.11 K/UL — SIGNIFICANT CHANGE UP (ref 0–0.5)
EOSINOPHIL NFR BLD AUTO: 2.2 % — SIGNIFICANT CHANGE UP (ref 0–6)
GLUCOSE SERPL-MCNC: 84 MG/DL
HCT VFR BLD CALC: 40.2 % — SIGNIFICANT CHANGE UP (ref 34.5–45)
HGB BLD-MCNC: 13.1 G/DL — SIGNIFICANT CHANGE UP (ref 11.5–15.5)
IMM GRANULOCYTES NFR BLD AUTO: 0.2 % — SIGNIFICANT CHANGE UP (ref 0–0.9)
LYMPHOCYTES # BLD AUTO: 1.78 K/UL — SIGNIFICANT CHANGE UP (ref 1–3.3)
LYMPHOCYTES # BLD AUTO: 35.1 % — SIGNIFICANT CHANGE UP (ref 13–44)
MCHC RBC-ENTMCNC: 28.1 PG — SIGNIFICANT CHANGE UP (ref 27–34)
MCHC RBC-ENTMCNC: 32.6 G/DL — SIGNIFICANT CHANGE UP (ref 32–36)
MCV RBC AUTO: 86.1 FL — SIGNIFICANT CHANGE UP (ref 80–100)
MONOCYTES # BLD AUTO: 0.36 K/UL — SIGNIFICANT CHANGE UP (ref 0–0.9)
MONOCYTES NFR BLD AUTO: 7.1 % — SIGNIFICANT CHANGE UP (ref 2–14)
NEUTROPHILS # BLD AUTO: 2.8 K/UL — SIGNIFICANT CHANGE UP (ref 1.8–7.4)
NEUTROPHILS NFR BLD AUTO: 55.2 % — SIGNIFICANT CHANGE UP (ref 43–77)
NRBC # BLD: 0 /100 WBCS — SIGNIFICANT CHANGE UP (ref 0–0)
PLATELET # BLD AUTO: 253 K/UL — SIGNIFICANT CHANGE UP (ref 150–400)
POTASSIUM SERPL-SCNC: 4.2 MMOL/L
PROT SERPL-MCNC: 7.5 G/DL
RBC # BLD: 4.67 M/UL — SIGNIFICANT CHANGE UP (ref 3.8–5.2)
RBC # FLD: 13.2 % — SIGNIFICANT CHANGE UP (ref 10.3–14.5)
SODIUM SERPL-SCNC: 142 MMOL/L
WBC # BLD: 5.07 K/UL — SIGNIFICANT CHANGE UP (ref 3.8–10.5)
WBC # FLD AUTO: 5.07 K/UL — SIGNIFICANT CHANGE UP (ref 3.8–10.5)

## 2023-10-09 PROCEDURE — 99214 OFFICE O/P EST MOD 30 MIN: CPT

## 2023-10-10 DIAGNOSIS — C50.919 MALIGNANT NEOPLASM OF UNSPECIFIED SITE OF UNSPECIFIED FEMALE BREAST: ICD-10-CM

## 2023-11-03 ENCOUNTER — OUTPATIENT (OUTPATIENT)
Dept: OUTPATIENT SERVICES | Facility: HOSPITAL | Age: 53
LOS: 1 days | End: 2023-11-03

## 2023-11-03 ENCOUNTER — APPOINTMENT (OUTPATIENT)
Dept: MRI IMAGING | Facility: CLINIC | Age: 53
End: 2023-11-03
Payer: MEDICAID

## 2023-11-03 DIAGNOSIS — Z98.890 OTHER SPECIFIED POSTPROCEDURAL STATES: Chronic | ICD-10-CM

## 2023-11-03 PROCEDURE — 77049 MRI BREAST C-+ W/CAD BI: CPT | Mod: 26

## 2023-11-07 ENCOUNTER — NON-APPOINTMENT (OUTPATIENT)
Age: 53
End: 2023-11-07

## 2023-11-07 DIAGNOSIS — N63.20 UNSPECIFIED LUMP IN THE LEFT BREAST, UNSPECIFIED QUADRANT: ICD-10-CM

## 2023-11-15 ENCOUNTER — APPOINTMENT (OUTPATIENT)
Dept: SURGICAL ONCOLOGY | Facility: CLINIC | Age: 53
End: 2023-11-15
Payer: MEDICAID

## 2023-11-15 VITALS
RESPIRATION RATE: 16 BRPM | OXYGEN SATURATION: 99 % | WEIGHT: 123 LBS | SYSTOLIC BLOOD PRESSURE: 154 MMHG | BODY MASS INDEX: 25.82 KG/M2 | HEART RATE: 81 BPM | HEIGHT: 58 IN | DIASTOLIC BLOOD PRESSURE: 91 MMHG

## 2023-11-15 PROCEDURE — 99213 OFFICE O/P EST LOW 20 MIN: CPT

## 2024-01-09 ENCOUNTER — OUTPATIENT (OUTPATIENT)
Dept: OUTPATIENT SERVICES | Facility: HOSPITAL | Age: 54
LOS: 1 days | Discharge: HOME | End: 2024-01-09

## 2024-01-09 DIAGNOSIS — Z98.890 OTHER SPECIFIED POSTPROCEDURAL STATES: Chronic | ICD-10-CM

## 2024-01-09 DIAGNOSIS — Z12.39 ENCOUNTER FOR OTHER SCREENING FOR MALIGNANT NEOPLASM OF BREAST: ICD-10-CM

## 2024-01-10 ENCOUNTER — NON-APPOINTMENT (OUTPATIENT)
Age: 54
End: 2024-01-10

## 2024-01-11 ENCOUNTER — APPOINTMENT (OUTPATIENT)
Dept: HEMATOLOGY ONCOLOGY | Facility: CLINIC | Age: 54
End: 2024-01-11
Payer: MEDICAID

## 2024-01-11 ENCOUNTER — LABORATORY RESULT (OUTPATIENT)
Age: 54
End: 2024-01-11

## 2024-01-11 ENCOUNTER — RESULT REVIEW (OUTPATIENT)
Age: 54
End: 2024-01-11

## 2024-01-11 ENCOUNTER — APPOINTMENT (OUTPATIENT)
Dept: INFUSION THERAPY | Facility: HOSPITAL | Age: 54
End: 2024-01-11

## 2024-01-11 VITALS
SYSTOLIC BLOOD PRESSURE: 147 MMHG | WEIGHT: 125.66 LBS | RESPIRATION RATE: 16 BRPM | BODY MASS INDEX: 26.38 KG/M2 | HEIGHT: 57.99 IN | HEART RATE: 87 BPM | DIASTOLIC BLOOD PRESSURE: 91 MMHG | TEMPERATURE: 96.8 F | OXYGEN SATURATION: 99 %

## 2024-01-11 PROCEDURE — 99214 OFFICE O/P EST MOD 30 MIN: CPT

## 2024-01-11 RX ORDER — EXEMESTANE 25 MG/1
25 TABLET, FILM COATED ORAL
Qty: 90 | Refills: 1 | Status: ACTIVE | COMMUNITY
Start: 2022-10-17 | End: 1900-01-01

## 2024-01-11 NOTE — PHYSICAL EXAM
[Normal] : affect appropriate [de-identified] : no dominant mass, nipple retraction or discharge. [de-identified] : decreased ROM right shoulder

## 2024-01-11 NOTE — ASSESSMENT
[FreeTextEntry1] : Lab work, breast MRI report, 11/15/23 surgery note reviewed. #1) 5/2022 Stage IIA ([id number]) right breast cancer, status post right breast conservation surgery/axillary lymph node dissection. ER positive/DC positive/HER2 CISH negative. Oncotype Dx. recurrence score of 8. Considering her premenopausal status, had discussed with patient that there may be an absolute benefit of chemotherapy with regard to improving 5 year disease free survival with an Oncotype Dx. recurrence score of 8 of up to ~ 5%, and OS survival improvement of up to ~ 2 %. As patient is greater than 45 years old, it is unclear if the benefit of chemotherapy may be due to chemotherapy-induced premature menopause. Ovarian suppression along with an aromatase inhibitor is a reasonable alternative per NCCN guidelines. Patient opted to proceed with RT/endocrine therapy( and declined chemotherapy). 6/27/2022-Began Lupron. 9/15/2022-10/12/2022-Radiation (Dr. Regalado)-4240 cGy right breast and right regional LN's with 1000 cGy right breast.. 10/2022-Began exemestane-appears to be tolerating thus far. BDT 8/2022-WNL. 5/15/2023-Mammogram/breast US-benign [id number] 2. 11/3/2023-Breast MRI-benign [id number] 2. --clinically LION  #2) continue f/u with PCP for BP management/glucose/primary care issues.  #3) to have screening colonoscopy-has referral from PCP  Patient was given the opportunity to ask questions. Her questions have been answered to her apparent satisfaction at this time. She expressed her understanding and willingness to comply with recommended follow-up.  --> Lupron today. Exemestane 25 mg PO daily; RTC 3 months.

## 2024-01-11 NOTE — HISTORY OF PRESENT ILLNESS
[Disease: _____________________] : Disease: [unfilled] [T: ___] : T[unfilled] [N: ___] : N[unfilled] [AJCC Stage: ____] : AJCC Stage: [unfilled] [de-identified] : 2/4/2022-Screening mammogram revealed a focal distortion 6:00 right breast, focal mass posterior right upper outer quadrant and focal asymmetry left breast.\par  \par  5/11/2022-DIagnostic mammogram and breast ultrasound revealed suspicious irregular hypoechoic mass at 6:00 right breast, corresponding with the mammographic distortion.\par  \par  3/11/2022-RIght breast ultrasound-guided core biopsy, 6:00-invasive well-differentiated ductal carcinoma measuring at least 0.7 cm.  DCIS, cribriform pattern with intermediate nuclear grade.  No LVI.  ER positive (greater than 75%, moderate)/MA positive (greater than 90%, strong)/HER2 equivocal (2+) with follow-up CISH testing negative (non-amplified).\par  \par  3/25/2022-Breast MRI-spiculated enhancing mass right breast corresponding to the known previously biopsied carcinoma.  Scattered progressively enhancing foci without suspicious region.  Negative for right axillary adenopathy.  Left breast showed a small spiculated enhancing mass in the lower outer quadrant measuring 12 mm for which biopsy recommended.\par  \par  4/8/2022-Left breast MRI guided biopsy-usual ductal hyperplasia, fibroadenomatoid changes and stromal fibrosis.\par  \par  5/17/2022-Status post right breast lumpectomy with pathology revealing an invasive moderately differentiated ductal carcinoma with focal micropapillary features, 15 mm.  DCIS.  Margins negative.  1 sentinel lymph node positive with macrometastasis, largest metastatic deposit 3 mm.\par  \par  6/27/2022-Began Lupron.\par  \par  9/15/2022-10/12/2022-Completed radiation-5240 cGy. Treatment Site: Right breast/Regional Nodes plus Boost. \par  \par  10/2022-Began Exemestane.\par   [de-identified] : Invasive ductal carcinoma [de-identified] : ER+/OH+/Her2- [de-identified] : Oncotype Dx. recurrence score of 8, suggesting no apparent benefit for group average absolute chemotherapy benefit. [de-identified] : Still getting PT for right shoulder. Right hand swelling. Seeing orthopedist next week. Prefers to stay with current PT, when I recommended she consider f/u PT with lymphedema center due to the hand swelling now. Has stated BP is high when comes to our office. Monitors BP at home-states is good at home. No c/o H/A, change in vision. No current pulmonary/GI//neurologic complaints.  No fevers.  No h/o abnormal bleeding.

## 2024-01-12 DIAGNOSIS — C50.919 MALIGNANT NEOPLASM OF UNSPECIFIED SITE OF UNSPECIFIED FEMALE BREAST: ICD-10-CM

## 2024-01-26 NOTE — CONSULT LETTER
[FreeTextEntry3] : Traci Rodríguez MD\par Breast Surgeon\par Division of Surgical Oncology\par Department of Surgery\par [address] [place] [address]\[place] [address] \par Tel: [phone]\par Fax: [phone]\par Email: [email or web address]  [email or web address]

## 2024-02-26 ENCOUNTER — APPOINTMENT (OUTPATIENT)
Dept: OBGYN | Facility: HOSPITAL | Age: 54
End: 2024-02-26

## 2024-03-26 ENCOUNTER — OUTPATIENT (OUTPATIENT)
Dept: OUTPATIENT SERVICES | Facility: HOSPITAL | Age: 54
LOS: 1 days | Discharge: HOME | End: 2024-03-26

## 2024-03-26 DIAGNOSIS — Z98.890 OTHER SPECIFIED POSTPROCEDURAL STATES: Chronic | ICD-10-CM

## 2024-03-26 DIAGNOSIS — C50.911 MALIGNANT NEOPLASM OF UNSPECIFIED SITE OF RIGHT FEMALE BREAST: ICD-10-CM

## 2024-04-02 PROBLEM — Z79.811 AROMATASE INHIBITOR USE: Status: ACTIVE | Noted: 2024-01-11

## 2024-04-04 ENCOUNTER — APPOINTMENT (OUTPATIENT)
Dept: HEMATOLOGY ONCOLOGY | Facility: CLINIC | Age: 54
End: 2024-04-04

## 2024-04-04 ENCOUNTER — NON-APPOINTMENT (OUTPATIENT)
Age: 54
End: 2024-04-04

## 2024-04-04 DIAGNOSIS — Z79.811 LONG TERM (CURRENT) USE OF AROMATASE INHIBITORS: ICD-10-CM

## 2024-04-04 NOTE — ASSESSMENT
[FreeTextEntry1] : CBC, CMP reviewed. #1) 5/2022 Stage IIA ([id number]) right breast cancer, status post right breast conservation surgery/axillary lymph node dissection. ER positive/NH positive/HER2 CISH negative. Oncotype Dx. recurrence score of 8. Considering her premenopausal status, had discussed with patient that there may be an absolute benefit of chemotherapy with regard to improving 5 year disease free survival with an Oncotype Dx. recurrence score of 8 of up to ~ 5%, and OS survival improvement of up to ~ 2 %. As patient is greater than 45 years old, it is unclear if the benefit of chemotherapy may be due to chemotherapy-induced premature menopause. Ovarian suppression along with an aromatase inhibitor was a reasonable alternative per NCCN guidelines. Patient opted to proceed with RT/endocrine therapy( and declined chemotherapy). 6/27/2022-Began Lupron. 9/15/2022-10/12/2022-Radiation (Dr. Regalado)-4240 cGy right breast and right regional LN's with 1000 cGy right breast.. 10/2022-Began exemestane. .5/15/2023-Mammogram/breast US-benign [id number] 2. Next breast imaging ordered by surgeon. 11/3/2023-Breast MRI-benign [id number] 2. --clinically LION  #2) BDT 8/2022-WNL.  #3) continue f/u with PCP for BP management/glucose/primary care issues.  #4) to have screening colonoscopy-has referral from PCP  Patient was given the opportunity to ask questions. Her questions have been answered to her apparent satisfaction at this time. She expressed her understanding and willingness to comply with recommended follow-up.  --> Lupron today. Exemestane 25 mg PO daily; RTC 3 months.

## 2024-04-04 NOTE — PHYSICAL EXAM
[de-identified] : no dominant mass, nipple retraction or discharge. [de-identified] : decreased ROM right shoulder

## 2024-04-04 NOTE — HISTORY OF PRESENT ILLNESS
[de-identified] : 2/4/2022-Screening mammogram revealed a focal distortion 6:00 right breast, focal mass posterior right upper outer quadrant and focal asymmetry left breast.\par  \par  5/11/2022-DIagnostic mammogram and breast ultrasound revealed suspicious irregular hypoechoic mass at 6:00 right breast, corresponding with the mammographic distortion.\par  \par  3/11/2022-RIght breast ultrasound-guided core biopsy, 6:00-invasive well-differentiated ductal carcinoma measuring at least 0.7 cm.  DCIS, cribriform pattern with intermediate nuclear grade.  No LVI.  ER positive (greater than 75%, moderate)/FL positive (greater than 90%, strong)/HER2 equivocal (2+) with follow-up CISH testing negative (non-amplified).\par  \par  3/25/2022-Breast MRI-spiculated enhancing mass right breast corresponding to the known previously biopsied carcinoma.  Scattered progressively enhancing foci without suspicious region.  Negative for right axillary adenopathy.  Left breast showed a small spiculated enhancing mass in the lower outer quadrant measuring 12 mm for which biopsy recommended.\par  \par  4/8/2022-Left breast MRI guided biopsy-usual ductal hyperplasia, fibroadenomatoid changes and stromal fibrosis.\par  \par  5/17/2022-Status post right breast lumpectomy with pathology revealing an invasive moderately differentiated ductal carcinoma with focal micropapillary features, 15 mm.  DCIS.  Margins negative.  1 sentinel lymph node positive with macrometastasis, largest metastatic deposit 3 mm.\par  \par  6/27/2022-Began Lupron.\par  \par  9/15/2022-10/12/2022-Completed radiation-5240 cGy. Treatment Site: Right breast/Regional Nodes plus Boost. \par  \par  10/2022-Began Exemestane.\par   [de-identified] : Invasive ductal carcinoma [de-identified] : ER+/MT+/Her2- [de-identified] : Oncotype Dx. recurrence score of 8, suggesting no apparent benefit for group average absolute chemotherapy benefit. [de-identified] :   --Still getting PT for right shoulder. Right hand swelling. Seeing orthopedist next week. Prefers to stay with current PT, when I recommended she consider f/u PT with lymphedema center due to the hand swelling now. Has stated BP is high when comes to our office. Monitors BP at home-states is good at home. No c/o H/A, change in vision. No current pulmonary/GI//neurologic complaints.  No fevers.  No h/o abnormal bleeding.

## 2024-04-19 ENCOUNTER — RESULT REVIEW (OUTPATIENT)
Age: 54
End: 2024-04-19

## 2024-04-19 ENCOUNTER — NON-APPOINTMENT (OUTPATIENT)
Age: 54
End: 2024-04-19

## 2024-04-19 ENCOUNTER — APPOINTMENT (OUTPATIENT)
Dept: RADIOLOGY | Facility: IMAGING CENTER | Age: 54
End: 2024-04-19
Payer: MEDICAID

## 2024-04-19 ENCOUNTER — OUTPATIENT (OUTPATIENT)
Dept: OUTPATIENT SERVICES | Facility: HOSPITAL | Age: 54
LOS: 1 days | End: 2024-04-19
Payer: MEDICAID

## 2024-04-19 ENCOUNTER — APPOINTMENT (OUTPATIENT)
Dept: HEMATOLOGY ONCOLOGY | Facility: CLINIC | Age: 54
End: 2024-04-19
Payer: MEDICAID

## 2024-04-19 ENCOUNTER — APPOINTMENT (OUTPATIENT)
Dept: INFUSION THERAPY | Facility: HOSPITAL | Age: 54
End: 2024-04-19

## 2024-04-19 VITALS
SYSTOLIC BLOOD PRESSURE: 157 MMHG | WEIGHT: 129.85 LBS | OXYGEN SATURATION: 98 % | RESPIRATION RATE: 16 BRPM | HEART RATE: 93 BPM | TEMPERATURE: 97.5 F | DIASTOLIC BLOOD PRESSURE: 98 MMHG | BODY MASS INDEX: 27.15 KG/M2

## 2024-04-19 DIAGNOSIS — M79.606 PAIN IN LEG, UNSPECIFIED: ICD-10-CM

## 2024-04-19 DIAGNOSIS — C50.911 MALIGNANT NEOPLASM OF UNSPECIFIED SITE OF RIGHT FEMALE BREAST: ICD-10-CM

## 2024-04-19 DIAGNOSIS — M79.605 PAIN IN RIGHT LEG: ICD-10-CM

## 2024-04-19 DIAGNOSIS — M79.604 PAIN IN RIGHT LEG: ICD-10-CM

## 2024-04-19 LAB
BASOPHILS # BLD AUTO: 0.02 K/UL — SIGNIFICANT CHANGE UP (ref 0–0.2)
BASOPHILS NFR BLD AUTO: 0.3 % — SIGNIFICANT CHANGE UP (ref 0–2)
EOSINOPHIL # BLD AUTO: 0.11 K/UL — SIGNIFICANT CHANGE UP (ref 0–0.5)
EOSINOPHIL NFR BLD AUTO: 1.9 % — SIGNIFICANT CHANGE UP (ref 0–6)
HCT VFR BLD CALC: 39.9 % — SIGNIFICANT CHANGE UP (ref 34.5–45)
HGB BLD-MCNC: 13 G/DL — SIGNIFICANT CHANGE UP (ref 11.5–15.5)
IMM GRANULOCYTES NFR BLD AUTO: 0.5 % — SIGNIFICANT CHANGE UP (ref 0–0.9)
LYMPHOCYTES # BLD AUTO: 1.82 K/UL — SIGNIFICANT CHANGE UP (ref 1–3.3)
LYMPHOCYTES # BLD AUTO: 31.3 % — SIGNIFICANT CHANGE UP (ref 13–44)
MCHC RBC-ENTMCNC: 29.1 PG — SIGNIFICANT CHANGE UP (ref 27–34)
MCHC RBC-ENTMCNC: 32.6 G/DL — SIGNIFICANT CHANGE UP (ref 32–36)
MCV RBC AUTO: 89.5 FL — SIGNIFICANT CHANGE UP (ref 80–100)
MONOCYTES # BLD AUTO: 0.4 K/UL — SIGNIFICANT CHANGE UP (ref 0–0.9)
MONOCYTES NFR BLD AUTO: 6.9 % — SIGNIFICANT CHANGE UP (ref 2–14)
NEUTROPHILS # BLD AUTO: 3.44 K/UL — SIGNIFICANT CHANGE UP (ref 1.8–7.4)
NEUTROPHILS NFR BLD AUTO: 59.1 % — SIGNIFICANT CHANGE UP (ref 43–77)
NRBC # BLD: 0 /100 WBCS — SIGNIFICANT CHANGE UP (ref 0–0)
PLATELET # BLD AUTO: 250 K/UL — SIGNIFICANT CHANGE UP (ref 150–400)
RBC # BLD: 4.46 M/UL — SIGNIFICANT CHANGE UP (ref 3.8–5.2)
RBC # FLD: 13.2 % — SIGNIFICANT CHANGE UP (ref 10.3–14.5)
WBC # BLD: 5.82 K/UL — SIGNIFICANT CHANGE UP (ref 3.8–10.5)
WBC # FLD AUTO: 5.82 K/UL — SIGNIFICANT CHANGE UP (ref 3.8–10.5)

## 2024-04-19 PROCEDURE — 73522 X-RAY EXAM HIPS BI 3-4 VIEWS: CPT | Mod: 26

## 2024-04-19 PROCEDURE — G2211 COMPLEX E/M VISIT ADD ON: CPT | Mod: NC,1L

## 2024-04-19 PROCEDURE — 72100 X-RAY EXAM L-S SPINE 2/3 VWS: CPT | Mod: 26

## 2024-04-19 PROCEDURE — 72100 X-RAY EXAM L-S SPINE 2/3 VWS: CPT

## 2024-04-19 PROCEDURE — 73522 X-RAY EXAM HIPS BI 3-4 VIEWS: CPT

## 2024-04-19 PROCEDURE — 99214 OFFICE O/P EST MOD 30 MIN: CPT

## 2024-04-19 NOTE — PHYSICAL EXAM
[de-identified] : no dominant mass, nipple retraction or discharge. [de-identified] : decreased ROM right shoulder

## 2024-04-19 NOTE — ASSESSMENT
[FreeTextEntry1] : Lab work, breast MRI report, 11/15/23 surgery note reviewed. #1) 5/2022 Stage IIA ([id number]) right breast cancer, status post right breast conservation surgery/axillary lymph node dissection. ER positive/CO positive/HER2 CISH negative. Oncotype Dx. recurrence score of 8. Considering her premenopausal status, had discussed with patient that there may be an absolute benefit of chemotherapy with regard to improving 5 year disease free survival with an Oncotype Dx. recurrence score of 8 of up to ~ 5%, and OS survival improvement of up to ~ 2 %. As patient is greater than 45 years old, it is unclear if the benefit of chemotherapy may be due to chemotherapy-induced premature menopause. Ovarian suppression along with an aromatase inhibitor is a reasonable alternative per NCCN guidelines. Patient opted to proceed with RT/endocrine therapy (and declined chemotherapy). 6/27/2022-Began Lupron. 9/15/2022-10/12/2022-Radiation (Dr. Regalado)-4240 cGy right breast and right regional LN's with 1000 cGy right breast.. 10/2022-Began exemestane-appears to be tolerating thus far. BDT 8/2022-WNL. 5/15/2023-Mammogram/breast US-benign [id number] 2. 11/3/2023-Breast MRI-benign [id number]-[id number] 2. --clinically LION  --plan to see breast surgeon in 5/2024   #2) continue f/u with PCP for BP management/glucose/primary care issues.  #3) to have screening colonoscopy-has referral from PCP  #4) low back, hips and legs pain  --Patient to have Xray of lumbar, hips and legs. Continue Tylenol for now.  Addendum-  Xray of lumbar showed spondylosis  Xray of hip/pelvis showed  IMPRESSION: 1.  No acute fracture or dislocation. 2.  Cartilage spaces are maintained. Referred to orthopedic, case was discussed with Dr. St.   Patient was given the opportunity to ask questions. Her questions have been answered to her apparent satisfaction at this time. She expressed her understanding and willingness to comply with recommended follow-up.  --> Eligard today. Exemestane 25 mg PO daily; RTC 3 months.

## 2024-04-19 NOTE — HISTORY OF PRESENT ILLNESS
[de-identified] : 2/4/2022-Screening mammogram revealed a focal distortion 6:00 right breast, focal mass posterior right upper outer quadrant and focal asymmetry left breast.  5/11/2022-DIagnostic mammogram and breast ultrasound revealed suspicious irregular hypoechoic mass at 6:00 right breast, corresponding with the mammographic distortion.  3/11/2022-RIght breast ultrasound-guided core biopsy, 6:00-invasive well-differentiated ductal carcinoma measuring at least 0.7 cm.  DCIS, cribriform pattern with intermediate nuclear grade.  No LVI.  ER positive (greater than 75%, moderate)/IL positive (greater than 90%, strong)/HER2 equivocal (2+) with follow-up CISH testing negative (non-amplified).  3/25/2022-Breast MRI-spiculated enhancing mass right breast corresponding to the known previously biopsied carcinoma.  Scattered progressively enhancing foci without suspicious region.  Negative for right axillary adenopathy.  Left breast showed a small spiculated enhancing mass in the lower outer quadrant measuring 12 mm for which biopsy recommended.  4/8/2022-Left breast MRI guided biopsy-usual ductal hyperplasia, fibroadenomatoid changes and stromal fibrosis.  5/17/2022-Status post right breast lumpectomy with pathology revealing an invasive moderately differentiated ductal carcinoma with focal micropapillary features, 15 mm.  DCIS.  Margins negative.  1 sentinel lymph node positive with macrometastasis, largest metastatic deposit 3 mm.  6/27/2022-Began Lupron.  9/15/2022-10/12/2022-Completed radiation-5240 cGy. Treatment Site: Right breast/Regional Nodes plus Boost.   10/2022-Began Exemestane.  [de-identified] : Invasive ductal carcinoma [de-identified] : ER+/KS+/Her2- [de-identified] : Oncotype Dx. recurrence score of 8, suggesting no apparent benefit for group average absolute chemotherapy benefit. [de-identified] : Patient had a transportation ride issue on 4/4/2024 and rescheduled her appointment as today. completed PT on right shoulder and feels better. However, she has worsening back, hips and legs pain over 2 months. The pain likely radiates from hip to the lower legs and right leg is worse than left.  Take Tylenol for pain which partially help. Denies any injuries.  Denies any new breast mass and compliant with exemestane, no issue with the medication. Has stated BP is high when comes to our office. Monitors BP at home-states is good at home. No c/o H/A, change in vision.

## 2024-04-20 LAB
ALBUMIN SERPL ELPH-MCNC: 4.3 G/DL
ALP BLD-CCNC: 49 U/L
ALT SERPL-CCNC: 31 U/L
ANION GAP SERPL CALC-SCNC: 12 MMOL/L
AST SERPL-CCNC: 23 U/L
BILIRUB SERPL-MCNC: 0.5 MG/DL
BUN SERPL-MCNC: 14 MG/DL
CALCIUM SERPL-MCNC: 9.5 MG/DL
CHLORIDE SERPL-SCNC: 107 MMOL/L
CO2 SERPL-SCNC: 25 MMOL/L
CREAT SERPL-MCNC: 0.77 MG/DL
EGFR: 92 ML/MIN/1.73M2
GLUCOSE SERPL-MCNC: 118 MG/DL
POTASSIUM SERPL-SCNC: 4 MMOL/L
PROT SERPL-MCNC: 7.4 G/DL
SODIUM SERPL-SCNC: 143 MMOL/L

## 2024-04-22 DIAGNOSIS — Z51.11 ENCOUNTER FOR ANTINEOPLASTIC CHEMOTHERAPY: ICD-10-CM

## 2024-04-25 ENCOUNTER — APPOINTMENT (OUTPATIENT)
Dept: ORTHOPEDIC SURGERY | Facility: CLINIC | Age: 54
End: 2024-04-25

## 2024-04-25 ENCOUNTER — NON-APPOINTMENT (OUTPATIENT)
Age: 54
End: 2024-04-25

## 2024-04-29 NOTE — HISTORY OF PRESENT ILLNESS
[de-identified] : Patient is a 53 year-old female who presents to the office today for initial evaluation of pain in both lower extremities.

## 2024-05-08 NOTE — ASSESSMENT
[FreeTextEntry1] : The patient is a 53 year old female referred for consultation by Dr. Estiven August for Right breast IDC now s/p Right breast lumpectomy and SLNB 5/2/2022.  5/2/2022 R breast lumpectomy, R SLNB - R SLNB (1/1) positive for carcinoma, 3mm. No extranodel extension. - R breast: IDC, moderately differentiated w/ focal micropapillary features, 15mm. DCIS, intermediate nuclear grade, solid, cribriform and micropapillary type, w/ calcs and focal necrosis. Lymphovascular invasion is identified. Sclerosing duct papilloma. Prior biopsy site changes. The invasive carcinoma is very close to, <1 mm, the designated inferior margin of the specimen. -R margins: all negative -AJCC Staging: [id number] -ODX 8 6/27/2022: Began Lupron 8/2/2022: Bone density: WNL 8/30/2022: Exam shows well-healed incisions. No evidence of infection. 10/2022: Completed RT and started Exemestane.  Recent MRI BR 2   Exam today shows no suspicious masses or lymphadenopathy  Plan:  - F/u med onc, continue exemestane - 5/2024 B/L SM/US - RTO 6 months.

## 2024-05-08 NOTE — CONSULT LETTER
[Dear  ___] : Dear  [unfilled], [Courtesy Letter:] : I had the pleasure of seeing your patient, [unfilled], in my office today. [Please see my note below.] : Please see my note below. [Sincerely,] : Sincerely, [FreeTextEntry3] : Traci Rodríguez MD Breast Surgeon Division of Surgical Oncology Department of Surgery [address] [place] [address]  Tel: [phone] Fax: [phone] Email: [email or web address]  [DrVashti  ___] : Dr. ROSENBAUM

## 2024-05-08 NOTE — HISTORY OF PRESENT ILLNESS
[FreeTextEntry1] : The patient is a 53 year old female referred for consultation by Dr. 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  Prior history: The patient reports that she does not get regular screening. Her first mammogram was 3 years ago, reportedly normal. Her most recent imaging showed:  2/03/2022 B/L SM (NW) TC 5.7%, scattered fibroglandular densities   - R breast 6:00 focal distortion -> DM and US  - R UOQ focal mass -> US    - L far posterior central to slightly outer breast -> DM and US  - BR0  2/11/2022 B/L DM  - R central lower breast 6:00 persistent architectural distortion  - L slightly outer asymmetry predominantly effaces  - L breast asymmetry not seen on ML view  2/11/2022 B/L US  - R 6:00 N5 1.4 x 0.7 x 0.7 cm irregular hypoechoic mass -> BX  - R 9:00 N10 6 mm benign-appearing IMLN  - R 9:00 N9 8 mm benign-appearing complicated cyst; adjacent 4 mm complicated cyst  - L negative  - BR4C  3/11/2022 R USG-CNB  - R 6:00 N5 (coil): IDC, G1, DCIS  - ER >75% mod, [place] >90% strong, HER2 2+/FISH negative (ratio 1.0, copy 1.8)  3/25/2022 MRI  - R spiculated mass posterior and slightly lateral to biopsied carcinoma  - L lower outer quadrant 12 mm small spiculated mass -> BX  - B/L LN negative  - BR6  The patient reports that she has not had the left breast biopsied. She does not feel the mass. Otherwise has no breast complaints.  She denies any medical or surgical history. She takes no medications Denies any family history of breast cancer. Has a maternal aunt with lung cancer.  4/8/2022 L MR bx  - L spiculated mass (hourglass clip): UDH, fibroadenomatoid changes, stromal fibrosis, benign concordant  5/2/2022 R breast lumpectomy, R SLNB - R SLNB (1/1) positive for carcinoma, 3mm. No extranodel extension. - R breast: IDC, moderately differentiated w/ focal micropapillary features, 15mm. DCIS, intermediate nuclear grade, solid, cribriform and micropapillary type, w/ calcs and focal necrosis. Lymphovascular invasion is identified. Sclerosing duct papilloma. Prior biopsy site changes. The invasive carcinoma is very close to, <1 mm, the designated inferior margin of the specimen. -R margins: all negative -AJCC Staging: [id number] -ODX 8  5/11/2022: The patient reports taking Tylenol immediately after surgery, but does not require anymore. She has some right shoulder pain, but this is longstanding. Is waiting to schedule shoulder surgery.  6/27/2022: Began Lupron  8/2/2022: Bone density: WNL  8/30/2022: pt met with med onc--no plan for chemotherapy. Radiation plan was on hold until followup with orthopedic surgeon regarding frozen shoulder. Most recent note indicates plan for PT now, and pt should proceed with RT. Planned to start RT tomorrow. Still feels some tenderness in the right breast.  5/15/2023 B/L DM (NW) revealed SFGD breasts - R central: postlumpectomy changes - L UOQ: bx marker from previous MR BX seen  5/15/2023 B/L US - R 6:00 N4: 15 x 11 x 9 mm seroma at scar site - L 2:00 N7: 3 mm cyst - BR2, mmg in 1 year  5/24/2023: Patient completed radiation therapy on 10/2022 and started Exemestane shortly after. Feels some leg cramping, gets better with activity. Recently had very sharp pain on right breast, but has since resolved. Denies any breast complaints today--no lumps, skin change, nipple discharge, no pain.  11/3/23 B/L MRI (NW)  - R postsurgical changes.  - L negative  - BR 2   11/15/2023: Pt still on exemestane, notes just pain in her feet, no longer in her legs. Feels slight pain in the breast occasionally. Denies lumps, skin changes, nipple discharge. Pt recently had right shoulder surgery 7/17/2023, but still no improvement in mobility.    Interval history:

## 2024-05-15 ENCOUNTER — APPOINTMENT (OUTPATIENT)
Dept: SURGICAL ONCOLOGY | Facility: CLINIC | Age: 54
End: 2024-05-15

## 2024-07-10 ENCOUNTER — OUTPATIENT (OUTPATIENT)
Dept: OUTPATIENT SERVICES | Facility: HOSPITAL | Age: 54
LOS: 1 days | Discharge: HOME | End: 2024-07-10

## 2024-07-10 DIAGNOSIS — Z98.890 OTHER SPECIFIED POSTPROCEDURAL STATES: Chronic | ICD-10-CM

## 2024-07-10 DIAGNOSIS — C50.911 MALIGNANT NEOPLASM OF UNSPECIFIED SITE OF RIGHT FEMALE BREAST: ICD-10-CM

## 2024-07-11 ENCOUNTER — APPOINTMENT (OUTPATIENT)
Dept: INFUSION THERAPY | Facility: HOSPITAL | Age: 54
End: 2024-07-11

## 2024-07-11 ENCOUNTER — APPOINTMENT (OUTPATIENT)
Dept: HEMATOLOGY ONCOLOGY | Facility: CLINIC | Age: 54
End: 2024-07-11

## 2024-07-11 DIAGNOSIS — Z79.811 LONG TERM (CURRENT) USE OF AROMATASE INHIBITORS: ICD-10-CM

## 2024-07-11 DIAGNOSIS — C50.911 MALIGNANT NEOPLASM OF UNSPECIFIED SITE OF RIGHT FEMALE BREAST: ICD-10-CM

## 2024-07-23 ENCOUNTER — APPOINTMENT (OUTPATIENT)
Dept: INFUSION THERAPY | Facility: HOSPITAL | Age: 54
End: 2024-07-23

## 2024-07-24 ENCOUNTER — RESULT REVIEW (OUTPATIENT)
Age: 54
End: 2024-07-24

## 2024-07-24 ENCOUNTER — APPOINTMENT (OUTPATIENT)
Age: 54
End: 2024-07-24
Payer: MEDICAID

## 2024-07-24 ENCOUNTER — NON-APPOINTMENT (OUTPATIENT)
Age: 54
End: 2024-07-24

## 2024-07-24 ENCOUNTER — APPOINTMENT (OUTPATIENT)
Age: 54
End: 2024-07-24

## 2024-07-24 DIAGNOSIS — Z51.11 ENCOUNTER FOR ANTINEOPLASTIC CHEMOTHERAPY: ICD-10-CM

## 2024-07-24 PROCEDURE — 76641 ULTRASOUND BREAST COMPLETE: CPT | Mod: 26,50

## 2024-07-24 PROCEDURE — 77066 DX MAMMO INCL CAD BI: CPT | Mod: 26

## 2024-07-24 PROCEDURE — 77062 BREAST TOMOSYNTHESIS BI: CPT | Mod: 26

## 2024-08-06 ENCOUNTER — APPOINTMENT (OUTPATIENT)
Dept: SURGICAL ONCOLOGY | Facility: CLINIC | Age: 54
End: 2024-08-06

## 2024-08-06 PROCEDURE — 99214 OFFICE O/P EST MOD 30 MIN: CPT

## 2024-08-06 NOTE — PHYSICAL EXAM
[Normocephalic] : normocephalic [Atraumatic] : atraumatic [EOMI] : extra ocular movement intact [PERRL] : pupils equal, round and reactive to light [Sclera nonicteric] : sclera nonicteric [Supple] : supple [No Supraclavicular Adenopathy] : no supraclavicular adenopathy [No Cervical Adenopathy] : no cervical adenopathy [Examined in the supine and seated position] : examined in the supine and seated position [Bra Size: ___] : Bra Size: [unfilled] [Grade 1] : Ptosis Grade 1 [No dominant masses] : no dominant masses in right breast  [No dominant masses] : no dominant masses left breast [No Nipple Retraction] : no left nipple retraction [No Nipple Discharge] : no left nipple discharge [Breast Mass Right Breast ___cm] : no masses [Breast Mass Left Breast ___cm] : no masses [Breast Nipple Retraction] : nipples not retracted [Breast Nipple Inversion] : nipples not inverted [Breast Nipple Flattening] : nipples not flattened [Breast Nipple Fissures] : nipples not fissured [Breast Abnormal Lactation (Galactorrhea)] : no galactorrhea [Breast Abnormal Secretion Bloody Fluid] : no bloody discharge [Breast Abnormal Secretion Opalescent Fluid] : no milky discharge [Breast Abnormal Secretion Serous Fluid] : no serous discharge [No Axillary Lymphadenopathy] : no left axillary lymphadenopathy [No Edema] : no edema [No Rashes] : no rashes [No Ulceration] : no ulceration [de-identified] : non-labored respirations  [de-identified] : circumareolar incision well-healed, some post RT skin thickening, mild hyperpigmentation [de-identified] : well-healed transverse incision

## 2024-08-06 NOTE — HISTORY OF PRESENT ILLNESS
[FreeTextEntry1] : The patient is a 54 year old female referred for consultation by Dr. 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  Prior history: The patient reports that she does not get regular screening. Her first mammogram was 3 years ago, reportedly normal. Her most recent imaging showed:  2/03/2022 B/L SM (NW) TC 5.7%, scattered fibroglandular densities   - R breast 6:00 focal distortion -> DM and US  - R UOQ focal mass -> US    - L far posterior central to slightly outer breast -> DM and US  - BR0  2/11/2022 B/L DM  - R central lower breast 6:00 persistent architectural distortion  - L slightly outer asymmetry predominantly effaces  - L breast asymmetry not seen on ML view  2/11/2022 B/L US  - R 6:00 N5 1.4 x 0.7 x 0.7 cm irregular hypoechoic mass -> BX  - R 9:00 N10 6 mm benign-appearing IMLN  - R 9:00 N9 8 mm benign-appearing complicated cyst; adjacent 4 mm complicated cyst  - L negative  - BR4C  3/11/2022 R USG-CNB  - R 6:00 N5 (coil): IDC, G1, DCIS  - ER >75% mod, [place] >90% strong, HER2 2+/FISH negative (ratio 1.0, copy 1.8)  3/25/2022 MRI  - R spiculated mass posterior and slightly lateral to biopsied carcinoma  - L lower outer quadrant 12 mm small spiculated mass -> BX  - B/L LN negative  - BR6  The patient reports that she has not had the left breast biopsied. She does not feel the mass. Otherwise has no breast complaints.  She denies any medical or surgical history. She takes no medications Denies any family history of breast cancer. Has a maternal aunt with lung cancer.  4/8/2022 L MR bx  - L spiculated mass (hourglass clip): UDH, fibroadenomatoid changes, stromal fibrosis, benign concordant  5/2/2022 R breast lumpectomy, R SLNB - R SLNB (1/1) positive for carcinoma, 3mm. No extranodel extension. - R breast: IDC, moderately differentiated w/ focal micropapillary features, 15mm. DCIS, intermediate nuclear grade, solid, cribriform and micropapillary type, w/ calcs and focal necrosis. Lymphovascular invasion is identified. Sclerosing duct papilloma. Prior biopsy site changes. The invasive carcinoma is very close to, <1 mm, the designated inferior margin of the specimen. -R margins: all negative -AJCC Staging: [id number] -ODX 8  5/11/2022: The patient reports taking Tylenol immediately after surgery, but does not require anymore. She has some right shoulder pain, but this is longstanding. Is waiting to schedule shoulder surgery.  6/27/2022: Began Lupron  8/2/2022: Bone density: WNL  8/30/2022: pt met with med onc--no plan for chemotherapy. Radiation plan was on hold until followup with orthopedic surgeon regarding frozen shoulder. Most recent note indicates plan for PT now, and pt should proceed with RT. Planned to start RT tomorrow. Still feels some tenderness in the right breast.  5/15/2023 B/L DM (NW) revealed SFGD breasts - R central: postlumpectomy changes - L UOQ: bx marker from previous MR BX seen  5/15/2023 B/L US - R 6:00 N4: 15 x 11 x 9 mm seroma at scar site - L 2:00 N7: 3 mm cyst - BR2, mmg in 1 year  5/24/2023: Patient completed radiation therapy on 10/2022 and started Exemestane shortly after. Feels some leg cramping, gets better with activity. Recently had very sharp pain on right breast, but has since resolved. Denies any breast complaints today--no lumps, skin change, nipple discharge, no pain.  11/3/23 B/L MRI (NW)  - R postsurgical changes.  - L negative  - BR 2   11/15/2023: Pt still on exemestane, notes just pain in her feet, no longer in her legs. Feels slight pain in the breast occasionally. Denies lumps, skin changes, nipple discharge. Pt recently had right shoulder surgery 7/17/2023, but still no improvement in mobility.   7/24/24 B/L DM (NW) scattered fibroglandular densities  -R anterior and axilla sites stable-appearing postsurgical scarring  - L upper outer biopsy marker   7/24/24 B/L US (NW)  - R 6:00 N4 seroma measuring 0.8 x 0.7 x 0.5 cm, smaller than prior study  - L 2:00 N7 cyst measuring 0.4 x 0.2 x 0.3 cm  - BR 2   Interval history: Still taking exemestane, still with pain in feet and also legs now. Still feels pain in the breast with palpation. Sometimes takes Tylenol which helps.  Patient denies any breast complaints today. Denies any breast masses, skin changes, or nipple discharge.

## 2024-08-06 NOTE — ASSESSMENT
[FreeTextEntry1] : The patient is a 53 year old female referred for consultation by Dr. Estiven August for Right breast IDC now s/p Right breast lumpectomy and SLNB 5/2/2022.  5/2/2022 R breast lumpectomy, R SLNB - R SLNB (1/1) positive for carcinoma, 3mm. No extranodel extension. - R breast: IDC, moderately differentiated w/ focal micropapillary features, 15mm. DCIS, intermediate nuclear grade, solid, cribriform and micropapillary type, w/ calcs and focal necrosis. Lymphovascular invasion is identified. Sclerosing duct papilloma. Prior biopsy site changes. The invasive carcinoma is very close to, <1 mm, the designated inferior margin of the specimen. -R margins: all negative -AJCC Staging: [id number] -ODX 8 6/27/2022: Began Lupron 8/2/2022: Bone density: WNL 8/30/2022: Exam shows well-healed incisions. No evidence of infection. 10/2022: Completed RT and started Exemestane.  Recent MRI BR 2  Recent B/L DM and US BR 2   Exam today shows no suspicious masses or lymphadenopathy  Plan:  - F/u med onc, continue exemestane - 7/2025 B/L SM/US - RTO 1 year
[FreeTextEntry1] : The patient is a 53 year old female referred for consultation by Dr. Estiven August for Right breast IDC now s/p Right breast lumpectomy and SLNB 5/2/2022.  5/2/2022 R breast lumpectomy, R SLNB - R SLNB (1/1) positive for carcinoma, 3mm. No extranodel extension. - R breast: IDC, moderately differentiated w/ focal micropapillary features, 15mm. DCIS, intermediate nuclear grade, solid, cribriform and micropapillary type, w/ calcs and focal necrosis. Lymphovascular invasion is identified. Sclerosing duct papilloma. Prior biopsy site changes. The invasive carcinoma is very close to, <1 mm, the designated inferior margin of the specimen. -R margins: all negative -AJCC Staging: [id number] -ODX 8 6/27/2022: Began Lupron 8/2/2022: Bone density: WNL 8/30/2022: Exam shows well-healed incisions. No evidence of infection. 10/2022: Completed RT and started Exemestane.  Recent MRI BR 2  Recent B/L DM and US BR 2   Exam today shows no suspicious masses or lymphadenopathy  Plan:  - F/u med onc, continue exemestane - 7/2025 B/L SM/US - RTO 1 year
VTE Assessment already completed for this visit

## 2024-08-06 NOTE — CONSULT LETTER
[FreeTextEntry3] : Traci Rodríguez MD Breast Surgeon Division of Surgical Oncology Department of Surgery [address] [place] [address]  Tel: [phone] Fax: [phone] Email: [email or web address]

## 2024-08-06 NOTE — PHYSICAL EXAM
[Normocephalic] : normocephalic [Atraumatic] : atraumatic [EOMI] : extra ocular movement intact [PERRL] : pupils equal, round and reactive to light [Sclera nonicteric] : sclera nonicteric [Supple] : supple [No Supraclavicular Adenopathy] : no supraclavicular adenopathy [No Cervical Adenopathy] : no cervical adenopathy [Examined in the supine and seated position] : examined in the supine and seated position [Bra Size: ___] : Bra Size: [unfilled] [Grade 1] : Ptosis Grade 1 [No dominant masses] : no dominant masses in right breast  [No dominant masses] : no dominant masses left breast [No Nipple Retraction] : no left nipple retraction [No Nipple Discharge] : no left nipple discharge [Breast Mass Right Breast ___cm] : no masses [Breast Mass Left Breast ___cm] : no masses [Breast Nipple Retraction] : nipples not retracted [Breast Nipple Inversion] : nipples not inverted [Breast Nipple Flattening] : nipples not flattened [Breast Nipple Fissures] : nipples not fissured [Breast Abnormal Lactation (Galactorrhea)] : no galactorrhea [Breast Abnormal Secretion Bloody Fluid] : no bloody discharge [Breast Abnormal Secretion Opalescent Fluid] : no milky discharge [Breast Abnormal Secretion Serous Fluid] : no serous discharge [No Axillary Lymphadenopathy] : no left axillary lymphadenopathy [No Edema] : no edema [No Rashes] : no rashes [No Ulceration] : no ulceration [de-identified] : non-labored respirations  [de-identified] : circumareolar incision well-healed, some post RT skin thickening, mild hyperpigmentation [de-identified] : well-healed transverse incision

## 2024-08-06 NOTE — HISTORY OF PRESENT ILLNESS
[FreeTextEntry1] : The patient is a 54 year old female referred for consultation by Dr. 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  Prior history: The patient reports that she does not get regular screening. Her first mammogram was 3 years ago, reportedly normal. Her most recent imaging showed:  2/03/2022 B/L SM (NW) TC 5.7%, scattered fibroglandular densities   - R breast 6:00 focal distortion -> DM and US  - R UOQ focal mass -> US    - L far posterior central to slightly outer breast -> DM and US  - BR0  2/11/2022 B/L DM  - R central lower breast 6:00 persistent architectural distortion  - L slightly outer asymmetry predominantly effaces  - L breast asymmetry not seen on ML view  2/11/2022 B/L US  - R 6:00 N5 1.4 x 0.7 x 0.7 cm irregular hypoechoic mass -> BX  - R 9:00 N10 6 mm benign-appearing IMLN  - R 9:00 N9 8 mm benign-appearing complicated cyst; adjacent 4 mm complicated cyst  - L negative  - BR4C  3/11/2022 R USG-CNB  - R 6:00 N5 (coil): IDC, G1, DCIS  - ER >75% mod, [place] >90% strong, HER2 2+/FISH negative (ratio 1.0, copy 1.8)  3/25/2022 MRI  - R spiculated mass posterior and slightly lateral to biopsied carcinoma  - L lower outer quadrant 12 mm small spiculated mass -> BX  - B/L LN negative  - BR6  The patient reports that she has not had the left breast biopsied. She does not feel the mass. Otherwise has no breast complaints.  She denies any medical or surgical history. She takes no medications Denies any family history of breast cancer. Has a maternal aunt with lung cancer.  4/8/2022 L MR bx  - L spiculated mass (hourglass clip): UDH, fibroadenomatoid changes, stromal fibrosis, benign concordant  5/2/2022 R breast lumpectomy, R SLNB - R SLNB (1/1) positive for carcinoma, 3mm. No extranodel extension. - R breast: IDC, moderately differentiated w/ focal micropapillary features, 15mm. DCIS, intermediate nuclear grade, solid, cribriform and micropapillary type, w/ calcs and focal necrosis. Lymphovascular invasion is identified. Sclerosing duct papilloma. Prior biopsy site changes. The invasive carcinoma is very close to, <1 mm, the designated inferior margin of the specimen. -R margins: all negative -AJCC Staging: [id number] -ODX 8  5/11/2022: The patient reports taking Tylenol immediately after surgery, but does not require anymore. She has some right shoulder pain, but this is longstanding. Is waiting to schedule shoulder surgery.  6/27/2022: Began Lupron  8/2/2022: Bone density: WNL  8/30/2022: pt met with med onc--no plan for chemotherapy. Radiation plan was on hold until followup with orthopedic surgeon regarding frozen shoulder. Most recent note indicates plan for PT now, and pt should proceed with RT. Planned to start RT tomorrow. Still feels some tenderness in the right breast.  5/15/2023 B/L DM (NW) revealed SFGD breasts - R central: postlumpectomy changes - L UOQ: bx marker from previous MR BX seen  5/15/2023 B/L US - R 6:00 N4: 15 x 11 x 9 mm seroma at scar site - L 2:00 N7: 3 mm cyst - BR2, mmg in 1 year  5/24/2023: Patient completed radiation therapy on 10/2022 and started Exemestane shortly after. Feels some leg cramping, gets better with activity. Recently had very sharp pain on right breast, but has since resolved. Denies any breast complaints today--no lumps, skin change, nipple discharge, no pain.  11/3/23 B/L MRI (NW)  - R postsurgical changes.  - L negative  - BR 2   11/15/2023: Pt still on exemestane, notes just pain in her feet, no longer in her legs. Feels slight pain in the breast occasionally. Denies lumps, skin changes, nipple discharge. Pt recently had right shoulder surgery 7/17/2023, but still no improvement in mobility.   7/24/24 B/L DM (NW) scattered fibroglandular densities  -R anterior and axilla sites stable-appearing postsurgical scarring  - L upper outer biopsy marker   7/24/24 B/L US (NW)  - R 6:00 N4 seroma measuring 0.8 x 0.7 x 0.5 cm, smaller than prior study  - L 2:00 N7 cyst measuring 0.4 x 0.2 x 0.3 cm  - BR 2   Interval history: Still taking exemestane, still with pain in feet and also legs now. Still feels pain in the breast with palpation. Sometimes takes Tylenol which helps.  Patient denies any breast complaints today. Denies any breast masses, skin changes, or nipple discharge.

## 2024-08-06 NOTE — CONSULT LETTER
[FreeTextEntry3] : Traci Rodríguez MD Breast Surgeon Division of Surgical Oncology Department of Surgery [address] [place] [address]  Tel: [phone] Fax: [phone] Email: [email or web address]

## 2024-09-17 ENCOUNTER — OUTPATIENT (OUTPATIENT)
Dept: OUTPATIENT SERVICES | Facility: HOSPITAL | Age: 54
LOS: 1 days | End: 2024-09-17

## 2024-09-17 ENCOUNTER — APPOINTMENT (OUTPATIENT)
Dept: OBGYN | Facility: HOSPITAL | Age: 54
End: 2024-09-17

## 2024-09-17 ENCOUNTER — RESULT REVIEW (OUTPATIENT)
Age: 54
End: 2024-09-17

## 2024-09-17 VITALS
TEMPERATURE: 98.1 F | SYSTOLIC BLOOD PRESSURE: 156 MMHG | HEART RATE: 86 BPM | BODY MASS INDEX: 28.48 KG/M2 | HEIGHT: 57 IN | DIASTOLIC BLOOD PRESSURE: 98 MMHG | WEIGHT: 132 LBS

## 2024-09-17 DIAGNOSIS — Z98.890 OTHER SPECIFIED POSTPROCEDURAL STATES: Chronic | ICD-10-CM

## 2024-09-17 PROCEDURE — 99213 OFFICE O/P EST LOW 20 MIN: CPT | Mod: GC,25

## 2024-09-17 PROCEDURE — 99396 PREV VISIT EST AGE 40-64: CPT | Mod: GC

## 2024-09-18 DIAGNOSIS — Z86.000 PERSONAL HISTORY OF IN-SITU NEOPLASM OF BREAST: ICD-10-CM

## 2024-09-18 DIAGNOSIS — I10 ESSENTIAL (PRIMARY) HYPERTENSION: ICD-10-CM

## 2024-09-18 LAB
C TRACH RRNA SPEC QL NAA+PROBE: SIGNIFICANT CHANGE UP
N GONORRHOEA RRNA SPEC QL NAA+PROBE: SIGNIFICANT CHANGE UP
SPECIMEN SOURCE: SIGNIFICANT CHANGE UP

## 2024-09-25 NOTE — PHYSICAL EXAM
[Chaperone Present] : A chaperone was present in the examining room during all aspects of the physical examination [Appropriately responsive] : appropriately responsive [Alert] : alert [No Acute Distress] : no acute distress [No Lymphadenopathy] : no lymphadenopathy [Regular Rate Rhythm] : regular rate rhythm [No Murmurs] : no murmurs [Clear to Auscultation B/L] : clear to auscultation bilaterally [Soft] : soft [Non-tender] : non-tender [Non-distended] : non-distended [No HSM] : No HSM [No Lesions] : no lesions [No Mass] : no mass [Oriented x3] : oriented x3 [Examination Of The Breasts] : a normal appearance [___] : a [unfilled] ~Ucm mastectomy scar [No Masses] : no breast masses were palpable [Labia Majora] : normal [Labia Minora] : normal [Discharge] : discharge [Scant] : scant [White] : white [Watery] : watery [Normal] : normal [Uterine Adnexae] : normal [Foul Smelling] : not foul smelling

## 2024-09-25 NOTE — HISTORY OF PRESENT ILLNESS
[Patient reported mammogram was normal] : Patient reported mammogram was normal [Patient reported PAP Smear was normal] : Patient reported PAP Smear was normal [Gonorrhea test offered] : Gonorrhea test offered [Chlamydia test offered] : Chlamydia test offered [postmenopausal] : postmenopausal [FreeTextEntry1] : 55yo  LMP 3 years ago presents for annual visit. Patient has not complaints. Patient is currently sexually active with one male partner, does not use any form of contraception. Denies vaginal irritation, dryness, hot flashes, night sweats, bleeding, any other s/sx menopause. Patient accepts STI testing today.  OB: NSVDx3, one child  after birth GYN: denies cysts, fibroids, abnormal paps, STI PMH: DCIS, HTN PSH: DCIS R breast s/p lumpectomy, SLNP  (follows with breast surgery and onc), shoulder surgery Current Rx: Exemestane, blood pressure pill (patient unsure of name or dose) All: NKDA Social: denies t/e/d HCM: - last pap  NILM HPV- - colonoscopy scheduled for 24 - last MMG  BIRADS-2, repeat 1y  [Mammogramdate] : 7/24 [PapSmeardate] : 2022 [TextBox_43] : scheduled for 9/20/24

## 2024-09-25 NOTE — PLAN
[FreeTextEntry1] : 55yo  LMP 3 years ago presents for annual exam. Patient without complaints at this time, HCM up to date. Patient has history of DCIS s/p R lumpectomy and SLNB currently in remission on Exemestane, follows with breast surgery and oncology.  - GC performed, f/u results  RTC 1y for annual or sooner PRN  d/w Dr. Mariangel Olmedo PGY1

## 2024-09-25 NOTE — HISTORY OF PRESENT ILLNESS
[Patient reported mammogram was normal] : Patient reported mammogram was normal [Patient reported PAP Smear was normal] : Patient reported PAP Smear was normal [Gonorrhea test offered] : Gonorrhea test offered [Chlamydia test offered] : Chlamydia test offered [postmenopausal] : postmenopausal [FreeTextEntry1] : 53yo  LMP 3 years ago presents for annual visit. Patient has not complaints. Patient is currently sexually active with one male partner, does not use any form of contraception. Denies vaginal irritation, dryness, hot flashes, night sweats, bleeding, any other s/sx menopause. Patient accepts STI testing today.  OB: NSVDx3, one child  after birth GYN: denies cysts, fibroids, abnormal paps, STI PMH: DCIS, HTN PSH: DCIS R breast s/p lumpectomy, SLNP  (follows with breast surgery and onc), shoulder surgery Current Rx: Exemestane, blood pressure pill (patient unsure of name or dose) All: NKDA Social: denies t/e/d HCM: - last pap  NILM HPV- - colonoscopy scheduled for 24 - last MMG  BIRADS-2, repeat 1y  [Mammogramdate] : 7/24 [PapSmeardate] : 2022 [TextBox_43] : scheduled for 9/20/24

## 2024-10-09 ENCOUNTER — OUTPATIENT (OUTPATIENT)
Dept: OUTPATIENT SERVICES | Facility: HOSPITAL | Age: 54
LOS: 1 days | Discharge: HOME | End: 2024-10-09

## 2024-10-09 DIAGNOSIS — C50.911 MALIGNANT NEOPLASM OF UNSPECIFIED SITE OF RIGHT FEMALE BREAST: ICD-10-CM

## 2024-10-09 DIAGNOSIS — Z98.890 OTHER SPECIFIED POSTPROCEDURAL STATES: Chronic | ICD-10-CM

## 2024-10-22 ENCOUNTER — APPOINTMENT (OUTPATIENT)
Dept: INFUSION THERAPY | Facility: HOSPITAL | Age: 54
End: 2024-10-22

## 2024-10-22 ENCOUNTER — RESULT REVIEW (OUTPATIENT)
Age: 54
End: 2024-10-22

## 2024-10-22 ENCOUNTER — APPOINTMENT (OUTPATIENT)
Dept: HEMATOLOGY ONCOLOGY | Facility: CLINIC | Age: 54
End: 2024-10-22
Payer: MEDICAID

## 2024-10-22 VITALS
SYSTOLIC BLOOD PRESSURE: 144 MMHG | BODY MASS INDEX: 28.39 KG/M2 | DIASTOLIC BLOOD PRESSURE: 101 MMHG | TEMPERATURE: 98.2 F | HEART RATE: 82 BPM | RESPIRATION RATE: 16 BRPM | WEIGHT: 131.17 LBS | OXYGEN SATURATION: 94 %

## 2024-10-22 VITALS — SYSTOLIC BLOOD PRESSURE: 126 MMHG | DIASTOLIC BLOOD PRESSURE: 90 MMHG

## 2024-10-22 DIAGNOSIS — C50.911 MALIGNANT NEOPLASM OF UNSPECIFIED SITE OF RIGHT FEMALE BREAST: ICD-10-CM

## 2024-10-22 DIAGNOSIS — Z79.811 LONG TERM (CURRENT) USE OF AROMATASE INHIBITORS: ICD-10-CM

## 2024-10-22 PROCEDURE — G2211 COMPLEX E/M VISIT ADD ON: CPT | Mod: NC

## 2024-10-22 PROCEDURE — 99214 OFFICE O/P EST MOD 30 MIN: CPT

## 2024-10-23 DIAGNOSIS — Z51.11 ENCOUNTER FOR ANTINEOPLASTIC CHEMOTHERAPY: ICD-10-CM

## 2024-10-23 LAB
ALBUMIN SERPL ELPH-MCNC: 4.4 G/DL
ALP BLD-CCNC: 51 U/L
ALT SERPL-CCNC: 39 U/L
ANION GAP SERPL CALC-SCNC: 16 MMOL/L
AST SERPL-CCNC: 31 U/L
BILIRUB SERPL-MCNC: 0.4 MG/DL
BUN SERPL-MCNC: 14 MG/DL
CALCIUM SERPL-MCNC: 9.6 MG/DL
CHLORIDE SERPL-SCNC: 107 MMOL/L
CO2 SERPL-SCNC: 20 MMOL/L
CREAT SERPL-MCNC: 0.74 MG/DL
EGFR: 96 ML/MIN/1.73M2
GLUCOSE SERPL-MCNC: 95 MG/DL
POTASSIUM SERPL-SCNC: 4.1 MMOL/L
PROT SERPL-MCNC: 7.3 G/DL
SODIUM SERPL-SCNC: 143 MMOL/L

## 2024-11-04 ENCOUNTER — APPOINTMENT (OUTPATIENT)
Dept: ORTHOPEDIC SURGERY | Facility: CLINIC | Age: 54
End: 2024-11-04

## 2024-11-05 ENCOUNTER — APPOINTMENT (OUTPATIENT)
Dept: RADIOLOGY | Facility: IMAGING CENTER | Age: 54
End: 2024-11-05
Payer: MEDICAID

## 2024-11-05 ENCOUNTER — OUTPATIENT (OUTPATIENT)
Dept: OUTPATIENT SERVICES | Facility: HOSPITAL | Age: 54
LOS: 1 days | End: 2024-11-05
Payer: MEDICAID

## 2024-11-05 DIAGNOSIS — Z98.890 OTHER SPECIFIED POSTPROCEDURAL STATES: Chronic | ICD-10-CM

## 2024-11-05 DIAGNOSIS — Z92.89 PERSONAL HISTORY OF OTHER MEDICAL TREATMENT: ICD-10-CM

## 2024-11-05 DIAGNOSIS — C50.911 MALIGNANT NEOPLASM OF UNSPECIFIED SITE OF RIGHT FEMALE BREAST: ICD-10-CM

## 2024-11-05 PROCEDURE — 77085 DXA BONE DENSITY AXL VRT FX: CPT

## 2024-11-05 PROCEDURE — 72100 X-RAY EXAM L-S SPINE 2/3 VWS: CPT

## 2024-11-05 PROCEDURE — 72100 X-RAY EXAM L-S SPINE 2/3 VWS: CPT | Mod: 26

## 2024-11-05 PROCEDURE — 77085 DXA BONE DENSITY AXL VRT FX: CPT | Mod: 26

## 2024-11-08 DIAGNOSIS — M47.819 SPONDYLOSIS W/OUT MYELOPATHY OR RADICULOPATHY, SITE UNSPECIFIED: ICD-10-CM

## 2024-12-27 ENCOUNTER — OUTPATIENT (OUTPATIENT)
Dept: OUTPATIENT SERVICES | Facility: HOSPITAL | Age: 54
LOS: 1 days | Discharge: HOME | End: 2024-12-27

## 2024-12-27 DIAGNOSIS — Z98.890 OTHER SPECIFIED POSTPROCEDURAL STATES: Chronic | ICD-10-CM

## 2024-12-27 DIAGNOSIS — C50.911 MALIGNANT NEOPLASM OF UNSPECIFIED SITE OF RIGHT FEMALE BREAST: ICD-10-CM

## 2025-01-06 ENCOUNTER — APPOINTMENT (OUTPATIENT)
Dept: HEMATOLOGY ONCOLOGY | Facility: CLINIC | Age: 55
End: 2025-01-06

## 2025-01-06 ENCOUNTER — APPOINTMENT (OUTPATIENT)
Dept: INFUSION THERAPY | Facility: HOSPITAL | Age: 55
End: 2025-01-06

## 2025-01-06 DIAGNOSIS — C50.911 MALIGNANT NEOPLASM OF UNSPECIFIED SITE OF RIGHT FEMALE BREAST: ICD-10-CM

## 2025-01-16 ENCOUNTER — NON-APPOINTMENT (OUTPATIENT)
Age: 55
End: 2025-01-16

## 2025-01-17 ENCOUNTER — RESULT REVIEW (OUTPATIENT)
Age: 55
End: 2025-01-17

## 2025-01-17 ENCOUNTER — LABORATORY RESULT (OUTPATIENT)
Age: 55
End: 2025-01-17

## 2025-01-17 ENCOUNTER — APPOINTMENT (OUTPATIENT)
Dept: INFUSION THERAPY | Facility: HOSPITAL | Age: 55
End: 2025-01-17

## 2025-01-17 ENCOUNTER — NON-APPOINTMENT (OUTPATIENT)
Age: 55
End: 2025-01-17

## 2025-01-17 ENCOUNTER — APPOINTMENT (OUTPATIENT)
Dept: HEMATOLOGY ONCOLOGY | Facility: CLINIC | Age: 55
End: 2025-01-17
Payer: MEDICAID

## 2025-01-17 VITALS
HEART RATE: 85 BPM | BODY MASS INDEX: 27.82 KG/M2 | WEIGHT: 128.95 LBS | HEIGHT: 57 IN | RESPIRATION RATE: 18 BRPM | TEMPERATURE: 97.2 F | OXYGEN SATURATION: 98 % | DIASTOLIC BLOOD PRESSURE: 87 MMHG | SYSTOLIC BLOOD PRESSURE: 134 MMHG

## 2025-01-17 DIAGNOSIS — D05.90 UNSPECIFIED TYPE OF CARCINOMA IN SITU OF UNSPECIFIED BREAST: ICD-10-CM

## 2025-01-17 DIAGNOSIS — R10.9 UNSPECIFIED ABDOMINAL PAIN: ICD-10-CM

## 2025-01-17 DIAGNOSIS — Z79.811 LONG TERM (CURRENT) USE OF AROMATASE INHIBITORS: ICD-10-CM

## 2025-01-17 PROCEDURE — 99215 OFFICE O/P EST HI 40 MIN: CPT

## 2025-01-17 RX ORDER — LISINOPRIL 20 MG/1
20 TABLET ORAL
Refills: 0 | Status: DISCONTINUED | COMMUNITY
End: 2025-01-17

## 2025-01-17 RX ORDER — LISINOPRIL 10 MG/1
10 TABLET ORAL
Refills: 0 | Status: ACTIVE | COMMUNITY

## 2025-01-17 RX ORDER — ERGOCALCIFEROL 1.25 MG/1
50000 CAPSULE ORAL
Refills: 0 | Status: ACTIVE | COMMUNITY

## 2025-01-22 DIAGNOSIS — N39.0 URINARY TRACT INFECTION, SITE NOT SPECIFIED: ICD-10-CM

## 2025-01-22 LAB
ALBUMIN SERPL ELPH-MCNC: 4.3 G/DL
ALP BLD-CCNC: 48 U/L
ALT SERPL-CCNC: 44 U/L
ANION GAP SERPL CALC-SCNC: 11 MMOL/L
APPEARANCE: CLEAR
AST SERPL-CCNC: 34 U/L
BILIRUB SERPL-MCNC: 0.6 MG/DL
BILIRUBIN URINE: NEGATIVE
BLOOD URINE: NEGATIVE
BUN SERPL-MCNC: 11 MG/DL
CALCIUM SERPL-MCNC: 9.6 MG/DL
CHLORIDE SERPL-SCNC: 105 MMOL/L
CO2 SERPL-SCNC: 26 MMOL/L
COLOR: YELLOW
CREAT SERPL-MCNC: 0.82 MG/DL
EGFR: 85 ML/MIN/1.73M2
GLUCOSE QUALITATIVE U: NEGATIVE MG/DL
GLUCOSE SERPL-MCNC: 92 MG/DL
KETONES URINE: NEGATIVE MG/DL
LEUKOCYTE ESTERASE URINE: ABNORMAL
NITRITE URINE: NEGATIVE
PH URINE: 7.5
POTASSIUM SERPL-SCNC: 3.9 MMOL/L
PROT SERPL-MCNC: 7.2 G/DL
PROTEIN URINE: NEGATIVE MG/DL
SODIUM SERPL-SCNC: 141 MMOL/L
SPECIFIC GRAVITY URINE: 1.01
UROBILINOGEN URINE: 0.2 MG/DL

## 2025-01-22 RX ORDER — CIPROFLOXACIN HYDROCHLORIDE 500 MG/1
500 TABLET, FILM COATED ORAL
Qty: 14 | Refills: 0 | Status: ACTIVE | COMMUNITY
Start: 2025-01-22 | End: 1900-01-01

## 2025-01-24 ENCOUNTER — APPOINTMENT (OUTPATIENT)
Dept: ORTHOPEDIC SURGERY | Facility: CLINIC | Age: 55
End: 2025-01-24

## 2025-01-24 VITALS
HEART RATE: 92 BPM | BODY MASS INDEX: 27.61 KG/M2 | SYSTOLIC BLOOD PRESSURE: 150 MMHG | WEIGHT: 128 LBS | DIASTOLIC BLOOD PRESSURE: 93 MMHG | HEIGHT: 57 IN

## 2025-01-24 DIAGNOSIS — S39.012A STRAIN OF MUSCLE, FASCIA AND TENDON OF LOWER BACK, INITIAL ENCOUNTER: ICD-10-CM

## 2025-01-24 DIAGNOSIS — M47.816 SPONDYLOSIS W/OUT MYELOPATHY OR RADICULOPATHY, LUMBAR REGION: ICD-10-CM

## 2025-01-24 DIAGNOSIS — M54.16 RADICULOPATHY, LUMBAR REGION: ICD-10-CM

## 2025-01-24 DIAGNOSIS — M41.9 SCOLIOSIS, UNSPECIFIED: ICD-10-CM

## 2025-01-24 PROCEDURE — 99203 OFFICE O/P NEW LOW 30 MIN: CPT

## 2025-01-24 PROCEDURE — 72170 X-RAY EXAM OF PELVIS: CPT

## 2025-01-24 PROCEDURE — 72100 X-RAY EXAM L-S SPINE 2/3 VWS: CPT

## 2025-01-24 RX ORDER — MELOXICAM 15 MG/1
15 TABLET ORAL
Qty: 30 | Refills: 1 | Status: ACTIVE | COMMUNITY
Start: 2025-01-24 | End: 1900-01-01

## 2025-04-16 ENCOUNTER — OUTPATIENT (OUTPATIENT)
Dept: OUTPATIENT SERVICES | Facility: HOSPITAL | Age: 55
LOS: 1 days | Discharge: HOME | End: 2025-04-16

## 2025-04-16 DIAGNOSIS — C50.911 MALIGNANT NEOPLASM OF UNSPECIFIED SITE OF RIGHT FEMALE BREAST: ICD-10-CM

## 2025-04-16 DIAGNOSIS — Z98.890 OTHER SPECIFIED POSTPROCEDURAL STATES: Chronic | ICD-10-CM

## 2025-04-22 ENCOUNTER — APPOINTMENT (OUTPATIENT)
Dept: HEMATOLOGY ONCOLOGY | Facility: CLINIC | Age: 55
End: 2025-04-22
Payer: MEDICAID

## 2025-04-22 ENCOUNTER — RESULT REVIEW (OUTPATIENT)
Age: 55
End: 2025-04-22

## 2025-04-22 ENCOUNTER — APPOINTMENT (OUTPATIENT)
Dept: INFUSION THERAPY | Facility: HOSPITAL | Age: 55
End: 2025-04-22

## 2025-04-22 VITALS
SYSTOLIC BLOOD PRESSURE: 155 MMHG | DIASTOLIC BLOOD PRESSURE: 97 MMHG | RESPIRATION RATE: 18 BRPM | WEIGHT: 131.15 LBS | HEART RATE: 104 BPM | BODY MASS INDEX: 28.39 KG/M2 | TEMPERATURE: 98.4 F | OXYGEN SATURATION: 98 %

## 2025-04-22 DIAGNOSIS — C50.911 MALIGNANT NEOPLASM OF UNSPECIFIED SITE OF RIGHT FEMALE BREAST: ICD-10-CM

## 2025-04-22 DIAGNOSIS — Z79.811 LONG TERM (CURRENT) USE OF AROMATASE INHIBITORS: ICD-10-CM

## 2025-04-22 PROCEDURE — G2211 COMPLEX E/M VISIT ADD ON: CPT | Mod: NC

## 2025-04-22 PROCEDURE — 99214 OFFICE O/P EST MOD 30 MIN: CPT

## 2025-04-23 LAB
ALBUMIN SERPL ELPH-MCNC: 4.4 G/DL
ALP BLD-CCNC: 52 U/L
ALT SERPL-CCNC: 39 U/L
ANION GAP SERPL CALC-SCNC: 15 MMOL/L
AST SERPL-CCNC: 29 U/L
BILIRUB SERPL-MCNC: 0.6 MG/DL
BUN SERPL-MCNC: 14 MG/DL
CALCIUM SERPL-MCNC: 9.6 MG/DL
CHLORIDE SERPL-SCNC: 106 MMOL/L
CO2 SERPL-SCNC: 23 MMOL/L
CREAT SERPL-MCNC: 0.73 MG/DL
EGFRCR SERPLBLD CKD-EPI 2021: 98 ML/MIN/1.73M2
ESTRADIOL SERPL-MCNC: 7 PG/ML
FSH SERPL-MCNC: 5.7 IU/L
GLUCOSE SERPL-MCNC: 100 MG/DL
POTASSIUM SERPL-SCNC: 4.4 MMOL/L
PROT SERPL-MCNC: 7.5 G/DL
SODIUM SERPL-SCNC: 144 MMOL/L

## 2025-06-28 ENCOUNTER — OUTPATIENT (OUTPATIENT)
Dept: OUTPATIENT SERVICES | Facility: HOSPITAL | Age: 55
LOS: 1 days | Discharge: HOME | End: 2025-06-28

## 2025-06-28 DIAGNOSIS — C50.911 MALIGNANT NEOPLASM OF UNSPECIFIED SITE OF RIGHT FEMALE BREAST: ICD-10-CM

## 2025-06-28 DIAGNOSIS — Z98.890 OTHER SPECIFIED POSTPROCEDURAL STATES: Chronic | ICD-10-CM

## 2025-07-03 ENCOUNTER — APPOINTMENT (OUTPATIENT)
Dept: INFUSION THERAPY | Facility: HOSPITAL | Age: 55
End: 2025-07-03

## 2025-07-03 ENCOUNTER — APPOINTMENT (OUTPATIENT)
Dept: HEMATOLOGY ONCOLOGY | Facility: CLINIC | Age: 55
End: 2025-07-03
Payer: MEDICAID

## 2025-07-03 VITALS
DIASTOLIC BLOOD PRESSURE: 90 MMHG | OXYGEN SATURATION: 97 % | TEMPERATURE: 97.5 F | BODY MASS INDEX: 27.43 KG/M2 | HEART RATE: 91 BPM | WEIGHT: 126.76 LBS | SYSTOLIC BLOOD PRESSURE: 134 MMHG | RESPIRATION RATE: 16 BRPM

## 2025-07-03 PROCEDURE — 99214 OFFICE O/P EST MOD 30 MIN: CPT

## 2025-07-03 PROCEDURE — G2211 COMPLEX E/M VISIT ADD ON: CPT | Mod: NC

## 2025-07-18 ENCOUNTER — APPOINTMENT (OUTPATIENT)
Dept: ORTHOPEDIC SURGERY | Facility: CLINIC | Age: 55
End: 2025-07-18
Payer: MEDICAID

## 2025-07-18 PROBLEM — M43.16 SPONDYLOLISTHESIS OF LUMBAR REGION: Status: ACTIVE | Noted: 2025-07-18

## 2025-07-18 PROBLEM — M41.9 SCOLIOSIS: Status: ACTIVE | Noted: 2025-07-18

## 2025-07-18 PROBLEM — M54.50 LUMBAR PAIN: Status: ACTIVE | Noted: 2025-07-18

## 2025-07-18 PROBLEM — M54.16 LUMBAR RADICULOPATHY: Status: ACTIVE | Noted: 2025-07-18

## 2025-07-18 PROCEDURE — 72100 X-RAY EXAM L-S SPINE 2/3 VWS: CPT

## 2025-07-18 PROCEDURE — 99204 OFFICE O/P NEW MOD 45 MIN: CPT

## 2025-07-23 ENCOUNTER — APPOINTMENT (OUTPATIENT)
Dept: ULTRASOUND IMAGING | Facility: IMAGING CENTER | Age: 55
End: 2025-07-23
Payer: MEDICAID

## 2025-07-23 ENCOUNTER — RESULT REVIEW (OUTPATIENT)
Age: 55
End: 2025-07-23

## 2025-07-23 ENCOUNTER — APPOINTMENT (OUTPATIENT)
Dept: MAMMOGRAPHY | Facility: IMAGING CENTER | Age: 55
End: 2025-07-23
Payer: MEDICAID

## 2025-07-23 ENCOUNTER — OUTPATIENT (OUTPATIENT)
Dept: OUTPATIENT SERVICES | Facility: HOSPITAL | Age: 55
LOS: 1 days | End: 2025-07-23
Payer: MEDICAID

## 2025-07-23 DIAGNOSIS — Z00.8 ENCOUNTER FOR OTHER GENERAL EXAMINATION: ICD-10-CM

## 2025-07-23 DIAGNOSIS — Z98.890 OTHER SPECIFIED POSTPROCEDURAL STATES: Chronic | ICD-10-CM

## 2025-07-23 DIAGNOSIS — C50.911 MALIGNANT NEOPLASM OF UNSPECIFIED SITE OF RIGHT FEMALE BREAST: ICD-10-CM

## 2025-07-23 PROCEDURE — 77066 DX MAMMO INCL CAD BI: CPT | Mod: 26

## 2025-07-23 PROCEDURE — 77066 DX MAMMO INCL CAD BI: CPT

## 2025-07-23 PROCEDURE — 76641 ULTRASOUND BREAST COMPLETE: CPT | Mod: 26,50

## 2025-07-23 PROCEDURE — 77062 BREAST TOMOSYNTHESIS BI: CPT | Mod: 26

## 2025-07-23 PROCEDURE — 76641 ULTRASOUND BREAST COMPLETE: CPT

## 2025-07-23 PROCEDURE — G0279: CPT

## 2025-07-30 ENCOUNTER — APPOINTMENT (OUTPATIENT)
Dept: MRI IMAGING | Facility: IMAGING CENTER | Age: 55
End: 2025-07-30
Payer: MEDICAID

## 2025-07-30 ENCOUNTER — OUTPATIENT (OUTPATIENT)
Dept: OUTPATIENT SERVICES | Facility: HOSPITAL | Age: 55
LOS: 1 days | End: 2025-07-30
Payer: MEDICAID

## 2025-07-30 DIAGNOSIS — C50.911 MALIGNANT NEOPLASM OF UNSPECIFIED SITE OF RIGHT FEMALE BREAST: ICD-10-CM

## 2025-07-30 DIAGNOSIS — Z98.890 OTHER SPECIFIED POSTPROCEDURAL STATES: Chronic | ICD-10-CM

## 2025-07-30 PROCEDURE — 70553 MRI BRAIN STEM W/O & W/DYE: CPT | Mod: 26

## 2025-07-30 PROCEDURE — 70553 MRI BRAIN STEM W/O & W/DYE: CPT

## 2025-07-30 PROCEDURE — A9585: CPT

## 2025-08-05 ENCOUNTER — APPOINTMENT (OUTPATIENT)
Dept: SURGICAL ONCOLOGY | Facility: CLINIC | Age: 55
End: 2025-08-05
Payer: MEDICAID

## 2025-08-05 VITALS
DIASTOLIC BLOOD PRESSURE: 98 MMHG | HEART RATE: 98 BPM | HEIGHT: 57 IN | SYSTOLIC BLOOD PRESSURE: 168 MMHG | WEIGHT: 126 LBS | BODY MASS INDEX: 27.18 KG/M2 | OXYGEN SATURATION: 96 %

## 2025-08-05 DIAGNOSIS — C50.911 MALIGNANT NEOPLASM OF UNSPECIFIED SITE OF RIGHT FEMALE BREAST: ICD-10-CM

## 2025-08-05 PROCEDURE — G2211 COMPLEX E/M VISIT ADD ON: CPT | Mod: NC

## 2025-08-05 PROCEDURE — 99214 OFFICE O/P EST MOD 30 MIN: CPT

## 2025-08-07 ENCOUNTER — APPOINTMENT (OUTPATIENT)
Dept: INFUSION THERAPY | Facility: HOSPITAL | Age: 55
End: 2025-08-07

## 2025-08-22 ENCOUNTER — APPOINTMENT (OUTPATIENT)
Dept: ORTHOPEDIC SURGERY | Facility: CLINIC | Age: 55
End: 2025-08-22
Payer: MEDICAID

## 2025-08-22 DIAGNOSIS — M43.16 SPONDYLOLISTHESIS, LUMBAR REGION: ICD-10-CM

## 2025-08-22 DIAGNOSIS — M54.50 LOW BACK PAIN, UNSPECIFIED: ICD-10-CM

## 2025-08-22 PROCEDURE — 99214 OFFICE O/P EST MOD 30 MIN: CPT

## (undated) DEVICE — DRAPE CHEST BREAST 106" X 122"

## (undated) DEVICE — DRSG STERISTRIPS 0.5 X 4"

## (undated) DEVICE — SUT VICRYL 3-0 27" SH UNDYED

## (undated) DEVICE — RADIOGRAPHY DVC SPEC TRANSPEC

## (undated) DEVICE — WARMING BLANKET LOWER ADULT

## (undated) DEVICE — VENODYNE/SCD SLEEVE CALF MEDIUM

## (undated) DEVICE — GOWN LG

## (undated) DEVICE — POSITIONER STRAP ARMBOARD VELCRO TS-30

## (undated) DEVICE — SOL IRR POUR NS 0.9% 500ML

## (undated) DEVICE — ELCTR BOVIE TIP BLADE INSULATED 2.75" EDGE

## (undated) DEVICE — GLV 7.5 PROTEXIS (WHITE)

## (undated) DEVICE — SUT SILK 2-0 30" SH

## (undated) DEVICE — ELCTR GROUNDING PAD ADULT COVIDIEN

## (undated) DEVICE — SUT MONOCRYL 4-0 27" PS-2 UNDYED

## (undated) DEVICE — GLV 7 PROTEXIS (WHITE)